# Patient Record
Sex: MALE | Race: BLACK OR AFRICAN AMERICAN | NOT HISPANIC OR LATINO | URBAN - METROPOLITAN AREA
[De-identification: names, ages, dates, MRNs, and addresses within clinical notes are randomized per-mention and may not be internally consistent; named-entity substitution may affect disease eponyms.]

---

## 2018-03-01 ENCOUNTER — EMERGENCY (EMERGENCY)
Facility: HOSPITAL | Age: 33
LOS: 1 days | Discharge: HOME | End: 2018-03-01
Admitting: EMERGENCY MEDICINE
Payer: COMMERCIAL

## 2018-03-01 VITALS
HEART RATE: 75 BPM | OXYGEN SATURATION: 99 % | SYSTOLIC BLOOD PRESSURE: 128 MMHG | DIASTOLIC BLOOD PRESSURE: 92 MMHG | RESPIRATION RATE: 16 BRPM | TEMPERATURE: 98 F
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
## 2018-03-01 DIAGNOSIS — S61.411A LACERATION WITHOUT FOREIGN BODY OF RIGHT HAND, INITIAL ENCOUNTER: ICD-10-CM
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
## 2018-03-01 DIAGNOSIS — Z23 ENCOUNTER FOR IMMUNIZATION: ICD-10-CM

## 2018-03-01 PROCEDURE — 73130 X-RAY EXAM OF HAND: CPT | Mod: 26,RT

## 2018-03-01 PROCEDURE — 99283 EMERGENCY DEPT VISIT LOW MDM: CPT | Mod: 25

## 2018-03-01 PROCEDURE — 12001 RPR S/N/AX/GEN/TRNK 2.5CM/<: CPT

## 2018-03-01 RX ORDER — TETANUS TOXOID, REDUCED DIPHTHERIA TOXOID AND ACELLULAR PERTUSSIS VACCINE, ADSORBED 5; 2.5; 8; 8; 2.5 [IU]/.5ML; [IU]/.5ML; UG/.5ML; UG/.5ML; UG/.5ML
0.5 SUSPENSION INTRAMUSCULAR ONCE
Qty: 0 | Refills: 0 | Status: COMPLETED | OUTPATIENT
Start: 2018-03-01 | End: 2018-03-01

## 2018-03-01 RX ORDER — CEPHALEXIN 500 MG
1 CAPSULE ORAL
Qty: 40 | Refills: 0 | OUTPATIENT
Start: 2018-03-01 | End: 2018-03-10

## 2018-03-01 RX ADMIN — TETANUS TOXOID, REDUCED DIPHTHERIA TOXOID AND ACELLULAR PERTUSSIS VACCINE, ADSORBED 0.5 MILLILITER(S): 5; 2.5; 8; 8; 2.5 SUSPENSION INTRAMUSCULAR at 15:24

## 2018-03-01 NOTE — ED PROVIDER NOTE - MUSCULOSKELETAL, MLM
Able to move all fingers against resistance, capillary refill less than 3 seconds. Distal pulses equal.

## 2018-03-01 NOTE — ED PROVIDER NOTE - SKIN, MLM
2cm laceration at hypothenar over palmar aspect of the right hand with exposed subcutaneous tissue, no active bleeding.

## 2018-03-01 NOTE — ED PROVIDER NOTE - OBJECTIVE STATEMENT
32 y/o male with no PMHx presents to ED c/o laceration to the right hand. While at work, patient was holding a 40 pound weight when he hit his hand against the metal edge. Denies focal weakness, paresthesia, fall, or LOC. Date of last tetanus is unknown. 32 y/o male with no PMHx presents to ED c/o laceration to the right hand. While at work, patient was holding a 40 pound weight when he hit his hand against the metal edge. Denies focal weakness, paresthesia, fall, or LOC. Date of last tetanus is unknown. patient is ambidextrous

## 2018-03-01 NOTE — ED ADULT TRIAGE NOTE - CHIEF COMPLAINT QUOTE
pt presents with laceration to the right hand, unsure of last tetanus shot. bleeding controlled at this time.

## 2018-03-09 ENCOUNTER — EMERGENCY (EMERGENCY)
Facility: HOSPITAL | Age: 33
LOS: 1 days | Discharge: HOME | End: 2018-03-09
Admitting: EMERGENCY MEDICINE
Payer: COMMERCIAL

## 2018-03-09 VITALS
OXYGEN SATURATION: 98 % | HEART RATE: 67 BPM | SYSTOLIC BLOOD PRESSURE: 111 MMHG | RESPIRATION RATE: 18 BRPM | DIASTOLIC BLOOD PRESSURE: 71 MMHG | TEMPERATURE: 98 F
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
## 2018-03-09 DIAGNOSIS — Z79.2 LONG TERM (CURRENT) USE OF ANTIBIOTICS: ICD-10-CM

## 2018-03-09 DIAGNOSIS — S61.411D LACERATION WITHOUT FOREIGN BODY OF RIGHT HAND, SUBSEQUENT ENCOUNTER: ICD-10-CM

## 2018-03-09 DIAGNOSIS — Z48.02 ENCOUNTER FOR REMOVAL OF SUTURES: ICD-10-CM
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
## 2018-03-09 PROCEDURE — 99282 EMERGENCY DEPT VISIT SF MDM: CPT

## 2018-03-09 NOTE — ED ADULT NURSE NOTE - OBJECTIVE STATEMENT
pt is a 32 year old male who comes in for suture removal to the right arm. pt denies any fevers, chills or pain.

## 2018-03-09 NOTE — ED PROVIDER NOTE - NS_EDPROVIDERDISPOUSERTYPE_ED_A_ED
I have personally evaluated and examined the patient. The Attending was available to me as a supervising provider if needed. Scribe Attestation (For Scribes USE Only)... Scribe Attestation (For Scribes USE Only).../I have personally evaluated and examined the patient. The Attending was available to me as a supervising provider if needed.

## 2018-03-09 NOTE — ED PROVIDER NOTE - OBJECTIVE STATEMENT
33 y/o Male presents to the ED for suture removal after 7 days. Pt states he has been washing wound with soap and water and has applied bacitracin with a band-aide, wrapped it with coband and then a sleeve. Denies fever chills, and drainage. States wound is tender unless he places bacitracin on it.

## 2018-03-09 NOTE — ED PROVIDER NOTE - MEDICAL DECISION MAKING DETAILS
Pt returned for suture removal of a hand laceration from 7 days ago. Wound is not well healed, likely secondary to excess moisture. Sutures left in place and advised more air time and return in 3 days.

## 2018-03-09 NOTE — ED ADULT NURSE NOTE - CHPI ED SYMPTOMS NEG
no inflammation/no chills/no purulent drainage/no bleeding at site/no redness/no bleeding/no drainage/no pain/no fever

## 2018-03-09 NOTE — ED PROVIDER NOTE - CARE PLAN
Principal Discharge DX:	Laceration of hand, foreign body presence unspecified, unspecified laterality, subsequent encounter

## 2019-07-22 ENCOUNTER — EMERGENCY (EMERGENCY)
Facility: HOSPITAL | Age: 34
LOS: 0 days | Discharge: HOME | End: 2019-07-22
Attending: EMERGENCY MEDICINE | Admitting: EMERGENCY MEDICINE
Payer: OTHER MISCELLANEOUS

## 2019-07-22 VITALS — WEIGHT: 259.93 LBS | HEIGHT: 71 IN

## 2019-07-22 VITALS
RESPIRATION RATE: 18 BRPM | DIASTOLIC BLOOD PRESSURE: 77 MMHG | HEART RATE: 62 BPM | SYSTOLIC BLOOD PRESSURE: 149 MMHG | TEMPERATURE: 99 F | OXYGEN SATURATION: 100 %

## 2019-07-22 DIAGNOSIS — S01.81XA LACERATION WITHOUT FOREIGN BODY OF OTHER PART OF HEAD, INITIAL ENCOUNTER: ICD-10-CM

## 2019-07-22 DIAGNOSIS — S09.90XA UNSPECIFIED INJURY OF HEAD, INITIAL ENCOUNTER: ICD-10-CM
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
## 2019-07-22 DIAGNOSIS — R55 SYNCOPE AND COLLAPSE: ICD-10-CM
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
## 2019-07-22 LAB
ALBUMIN SERPL ELPH-MCNC: 4.2 G/DL — SIGNIFICANT CHANGE UP (ref 3.5–5.2)
ALP SERPL-CCNC: 84 U/L — SIGNIFICANT CHANGE UP (ref 30–115)
ALT FLD-CCNC: 21 U/L — SIGNIFICANT CHANGE UP (ref 0–41)
ANION GAP SERPL CALC-SCNC: 10 MMOL/L — SIGNIFICANT CHANGE UP (ref 7–14)
APTT BLD: 30.2 SEC — SIGNIFICANT CHANGE UP (ref 27–39.2)
AST SERPL-CCNC: 24 U/L — SIGNIFICANT CHANGE UP (ref 0–41)
BASOPHILS # BLD AUTO: 0.03 K/UL — SIGNIFICANT CHANGE UP (ref 0–0.2)
BASOPHILS NFR BLD AUTO: 0.3 % — SIGNIFICANT CHANGE UP (ref 0–1)
BILIRUB SERPL-MCNC: 0.3 MG/DL — SIGNIFICANT CHANGE UP (ref 0.2–1.2)
BUN SERPL-MCNC: 15 MG/DL — SIGNIFICANT CHANGE UP (ref 10–20)
CALCIUM SERPL-MCNC: 9.3 MG/DL — SIGNIFICANT CHANGE UP (ref 8.5–10.1)
CHLORIDE SERPL-SCNC: 101 MMOL/L — SIGNIFICANT CHANGE UP (ref 98–110)
CO2 SERPL-SCNC: 27 MMOL/L — SIGNIFICANT CHANGE UP (ref 17–32)
CREAT SERPL-MCNC: 1.1 MG/DL — SIGNIFICANT CHANGE UP (ref 0.7–1.5)
EOSINOPHIL # BLD AUTO: 0.11 K/UL — SIGNIFICANT CHANGE UP (ref 0–0.7)
EOSINOPHIL NFR BLD AUTO: 1 % — SIGNIFICANT CHANGE UP (ref 0–8)
ETHANOL SERPL-MCNC: <10 MG/DL — SIGNIFICANT CHANGE UP
GLUCOSE BLDC GLUCOMTR-MCNC: 106 MG/DL — HIGH (ref 70–99)
GLUCOSE SERPL-MCNC: 111 MG/DL — HIGH (ref 70–99)
HCT VFR BLD CALC: 40.4 % — LOW (ref 42–52)
HGB BLD-MCNC: 13.3 G/DL — LOW (ref 14–18)
IMM GRANULOCYTES NFR BLD AUTO: 1.1 % — HIGH (ref 0.1–0.3)
INR BLD: 0.96 RATIO — SIGNIFICANT CHANGE UP (ref 0.65–1.3)
LACTATE SERPL-SCNC: 1 MMOL/L — SIGNIFICANT CHANGE UP (ref 0.5–2.2)
LIDOCAIN IGE QN: 19 U/L — SIGNIFICANT CHANGE UP (ref 7–60)
LYMPHOCYTES # BLD AUTO: 3.57 K/UL — HIGH (ref 1.2–3.4)
LYMPHOCYTES # BLD AUTO: 32.2 % — SIGNIFICANT CHANGE UP (ref 20.5–51.1)
MCHC RBC-ENTMCNC: 28.7 PG — SIGNIFICANT CHANGE UP (ref 27–31)
MCHC RBC-ENTMCNC: 32.9 G/DL — SIGNIFICANT CHANGE UP (ref 32–37)
MCV RBC AUTO: 87.3 FL — SIGNIFICANT CHANGE UP (ref 80–94)
MONOCYTES # BLD AUTO: 0.7 K/UL — HIGH (ref 0.1–0.6)
MONOCYTES NFR BLD AUTO: 6.3 % — SIGNIFICANT CHANGE UP (ref 1.7–9.3)
NEUTROPHILS # BLD AUTO: 6.56 K/UL — HIGH (ref 1.4–6.5)
NEUTROPHILS NFR BLD AUTO: 59.1 % — SIGNIFICANT CHANGE UP (ref 42.2–75.2)
NRBC # BLD: 0 /100 WBCS — SIGNIFICANT CHANGE UP (ref 0–0)
PLATELET # BLD AUTO: 242 K/UL — SIGNIFICANT CHANGE UP (ref 130–400)
POTASSIUM SERPL-MCNC: 3.9 MMOL/L — SIGNIFICANT CHANGE UP (ref 3.5–5)
POTASSIUM SERPL-SCNC: 3.9 MMOL/L — SIGNIFICANT CHANGE UP (ref 3.5–5)
PROT SERPL-MCNC: 7.3 G/DL — SIGNIFICANT CHANGE UP (ref 6–8)
PROTHROM AB SERPL-ACNC: 11 SEC — SIGNIFICANT CHANGE UP (ref 9.95–12.87)
RBC # BLD: 4.63 M/UL — LOW (ref 4.7–6.1)
RBC # FLD: 14.7 % — HIGH (ref 11.5–14.5)
SODIUM SERPL-SCNC: 138 MMOL/L — SIGNIFICANT CHANGE UP (ref 135–146)
TROPONIN T SERPL-MCNC: <0.01 NG/ML — SIGNIFICANT CHANGE UP
WBC # BLD: 11.09 K/UL — HIGH (ref 4.8–10.8)
WBC # FLD AUTO: 11.09 K/UL — HIGH (ref 4.8–10.8)

## 2019-07-22 PROCEDURE — 93308 TTE F-UP OR LMTD: CPT | Mod: 26

## 2019-07-22 PROCEDURE — 72170 X-RAY EXAM OF PELVIS: CPT | Mod: 26

## 2019-07-22 PROCEDURE — 12011 RPR F/E/E/N/L/M 2.5 CM/<: CPT

## 2019-07-22 PROCEDURE — 71045 X-RAY EXAM CHEST 1 VIEW: CPT | Mod: 26

## 2019-07-22 PROCEDURE — 71260 CT THORAX DX C+: CPT | Mod: 26

## 2019-07-22 PROCEDURE — 93010 ELECTROCARDIOGRAM REPORT: CPT

## 2019-07-22 PROCEDURE — 99282 EMERGENCY DEPT VISIT SF MDM: CPT

## 2019-07-22 PROCEDURE — 72125 CT NECK SPINE W/O DYE: CPT | Mod: 26

## 2019-07-22 PROCEDURE — 70486 CT MAXILLOFACIAL W/O DYE: CPT | Mod: 26

## 2019-07-22 PROCEDURE — 70450 CT HEAD/BRAIN W/O DYE: CPT | Mod: 26

## 2019-07-22 PROCEDURE — 76705 ECHO EXAM OF ABDOMEN: CPT | Mod: 26

## 2019-07-22 PROCEDURE — 76604 US EXAM CHEST: CPT | Mod: 26

## 2019-07-22 PROCEDURE — 99285 EMERGENCY DEPT VISIT HI MDM: CPT | Mod: 25

## 2019-07-22 PROCEDURE — 74177 CT ABD & PELVIS W/CONTRAST: CPT | Mod: 26

## 2019-07-22 RX ORDER — METHOCARBAMOL 500 MG/1
2 TABLET, FILM COATED ORAL
Qty: 42 | Refills: 0
Start: 2019-07-22 | End: 2019-07-28

## 2019-07-22 NOTE — ED PROCEDURE NOTE - US POC STATEMENT
The patient/family was/were informed of limited nature of the exam. Representative images were printed to be scanned into the chart.

## 2019-07-22 NOTE — ED PROVIDER NOTE - NS ED ROS FT
Constitutional: No altered mental status.  Eyes: No visual changes.  ENT: No hearing loss.  Neck: No neck pain or stiffness.  Cardiovascular: No chest pain, palpitations.  Pulmonary: No SOB. No hemoptysis.  Abdominal: No abdominal pain, nausea, vomiting.   : No hematuria.  Neuro: +headache, +syncope. no dizziness.  MS: No back pain. No deformities.  Psych: No suicidal ideations.

## 2019-07-22 NOTE — ED PROVIDER NOTE - NSFOLLOWUPINSTRUCTIONS_ED_ALL_ED_FT
Syncope    Syncope is when you temporarily lose consciousness, also called fainting or passing out. It is caused by a sudden decrease in blood flow to the brain. Even though most causes of syncope are not dangerous, syncope can possibly be a sign of a serious medical problem. Signs that you may be about to faint include feeling dizzy, lightheaded, nausea, visual changes, or cold/clammy skin. Do not drive, operate heavy machinery, or play sports until your health care provider says it is okay.    SEEK IMMEDIATE MEDICAL CARE IF YOU HAVE ANY OF THE FOLLOWING SYMPTOMS: severe headache, pain in your chest/abdomen/back, bleeding from your mouth or rectum, palpitations, shortness of breath, pain with breathing, seizure, confusion, or trouble walking.    Laceration    A laceration is a cut that goes through all of the layers of the skin and into the tissue that is right under the skin. Some lacerations heal on their own. Others need to be closed with skin adhesive strips, skin glue, stitches (sutures), or staples. Proper laceration care minimizes the risk of infection and helps the laceration to heal better.  If non-absorbable stitches or staples have been placed, they must be taken out within the time frame instructed by your healthcare provider.    SEEK IMMEDIATE MEDICAL CARE IF YOU HAVE ANY OF THE FOLLOWING SYMPTOMS: swelling around the wound, worsening pain, drainage from the wound, red streaking going away from your wound, inability to move finger or toe near the laceration, or discoloration of skin near the laceration.    Closed Head Injury    A closed head injury is an injury to your head that may or may not involve a traumatic brain injury (TBI). Symptoms of TBI can be short or long lasting and include headache, dizziness, interference with memory or speech, fatigue, confusion, changes in sleep, mood changes, nausea, depression/anxiety, and dulling of senses. Make sure to obtain proper rest which includes getting plenty of sleep, avoiding excessive visual stimulation, and avoiding activities that may cause physical or mental stress. Avoid any situation where there is potential for another head injury, including sports.    SEEK IMMEDIATE MEDICAL CARE IF YOU HAVE ANY OF THE FOLLOWING SYMPTOMS: unusual drowsiness, vomiting, severe dizziness, seizures, lightheadedness, muscular weakness, different pupil sizes, visual changes, or clear or bloody discharge from your ears or nose.    Take ibuprofen 600 mg with tylenol 650mg up to three times a day with food as needed for pain/headache. Take Robaxin as directed for spasms.

## 2019-07-22 NOTE — ED PROVIDER NOTE - CLINICAL SUMMARY MEDICAL DECISION MAKING FREE TEXT BOX
Trauma work up negative and shows no serious injury.  FAST negative.  Bedside echo normal. Laceration repaired as described.  Patient ate and drank and walked around without issue.  He feels much better. ED work up discussed and stay in OBS for syncope offered.  Patient would prefer to be discharged with appropriate instructions, home care and follow up.  Patient to return in 5 days for wound check and suture removal.     Full DC instructions discussed and patient knows when to seek immediate medical attention.  Patient has proper follow up.  All results discussed and patient aware they may require further work up.  Proper follow up ensured. Limitations of ED work up discussed.  Medications administered and prescribed/OTC home meds discussed.  All questions and concerns from patient or family addressed. Understanding of instructions verbalized.

## 2019-07-22 NOTE — ED PROCEDURE NOTE - PROCEDURE NAME, MLM
Laceration Repair
Point of Care Ultrasound Cardiac
Point of Care Ultrasound Lung
Point of Care Ultrasound FAST
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
## 2019-07-22 NOTE — ED PROCEDURE NOTE - ATTENDING CONTRIBUTION TO CARE
I was physically present for and directly supervised this procedure.  Procedure was done as documented without any complications.

## 2019-07-22 NOTE — ED PROVIDER NOTE - NSFOLLOWUPCLINICS_GEN_ALL_ED_FT
[facility] Concussion Program  Concussion Program  [address] [place] [address]   Phone: [phone]  Fax:   Follow Up Time:

## 2019-07-22 NOTE — ED PROVIDER NOTE - PHYSICAL EXAMINATION
Constitutional: Well developed, well nourished. NAD  TRAUMA: ABC intact. GCS 15. FAST negative.  Head: Normocephalic, atraumatic.  Eyes: PERRL. EOMI. No Raccoon eyes.   ENT: No nasal discharge. No septal hematoma. No Villarreal sign. Mucous membranes moist.  Neck: Supple. Painless ROM. No midline tenderness, stepoffs.  Cardiovascular: Normal S1, S2. Regular rate and rhythm. No murmurs, rubs, or gallops.  Pulmonary: Normal respiratory rate and effort. Lungs clear to auscultation bilaterally. No wheezing, rales, or rhonchi.  CHEST: No chest wall tenderness, crepitus.  Abdominal: Soft. Nondistended. Nontender. No rebound, guarding, rigidity.  BACK: No midline T/L/S tenderness, stepoffs. No saddle paresthesia.  Extremities. Pelvis stable. No traumatic deformities, tenderness of extremities.  Skin: No rashes, cyanosis. +laceration above L eye 2 cm.  Neuro: AAOx3. Strength 5/5 in all extremities. Sensation intact throughout. No focal neurological deficits.  Psych: Normal mood. Normal affect.
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
## 2019-07-22 NOTE — ED ADULT NURSE NOTE - NSIMPLEMENTINTERV_GEN_ALL_ED
67.6
Implemented All Fall with Harm Risk Interventions:  [place] to call system. Call bell, personal items and telephone within reach. Instruct patient to call for assistance. Room bathroom lighting operational. Non-slip footwear when patient is off stretcher. Physically safe environment: no spills, clutter or unnecessary equipment. Stretcher in lowest position, wheels locked, appropriate side rails in place. Provide visual cue, wrist band, yellow gown, etc. Monitor gait and stability. Monitor for mental status changes and reorient to person, place, and time. Review medications for side effects contributing to fall risk. Reinforce activity limits and safety measures with patient and family. Provide visual clues: red socks.

## 2019-07-22 NOTE — ED ADULT NURSE NOTE - OBJECTIVE STATEMENT
Pt presents to ED S/P work injury. Pt states he was hit with a beam at work. Pt reports positive LOC, denies use of anticoagulation/antiplatelet medication. 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 Pt with left forehead laceration above left eyebrow.
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
## 2019-07-24 PROBLEM — Z78.9 OTHER SPECIFIED HEALTH STATUS: Chronic | Status: ACTIVE | Noted: 2019-07-22

## 2019-07-25 ENCOUNTER — OTHER (OUTPATIENT)
Age: 34
End: 2019-07-25

## 2019-07-25 ENCOUNTER — TRANSCRIPTION ENCOUNTER (OUTPATIENT)
Age: 34
End: 2019-07-25

## 2019-07-25 ENCOUNTER — APPOINTMENT (OUTPATIENT)
Dept: FAMILY MEDICINE | Facility: CLINIC | Age: 34
End: 2019-07-25
Payer: COMMERCIAL

## 2019-07-25 VITALS
BODY MASS INDEX: 34.72 KG/M2 | HEART RATE: 98 BPM | TEMPERATURE: 99 F | HEIGHT: 71 IN | DIASTOLIC BLOOD PRESSURE: 72 MMHG | SYSTOLIC BLOOD PRESSURE: 110 MMHG | WEIGHT: 248 LBS | OXYGEN SATURATION: 63 %

## 2019-07-25 DIAGNOSIS — Z78.9 OTHER SPECIFIED HEALTH STATUS: ICD-10-CM

## 2019-07-25 PROCEDURE — 99203 OFFICE O/P NEW LOW 30 MIN: CPT

## 2019-07-25 RX ORDER — METHOCARBAMOL 500 MG/1
TABLET, FILM COATED ORAL
Refills: 0 | Status: COMPLETED | COMMUNITY

## 2019-07-25 NOTE — PHYSICAL EXAM
[Coordination Grossly Intact] : coordination grossly intact [No Focal Deficits] : no focal deficits [Normal Gait] : normal gait [Deep Tendon Reflexes (DTR)] : deep tendon reflexes were 2+ and symmetric [Normal] : affect was normal and insight and judgment were intact [de-identified] : edematous left eye with large area of ecchymosis. Laceration left eyebrow, healing well. No discharge. 5 sutures in place. [de-identified] : Loss of sensation over V1 distribution on left side of face.
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
## 2019-07-25 NOTE — REVIEW OF SYSTEMS
[Fatigue] : fatigue [Discharge] : discharge [Headache] : headache [Dizziness] : dizziness [Negative] : Heme/Lymph

## 2019-07-30 ENCOUNTER — TRANSCRIPTION ENCOUNTER (OUTPATIENT)
Age: 34
End: 2019-07-30

## 2019-08-01 ENCOUNTER — TRANSCRIPTION ENCOUNTER (OUTPATIENT)
Age: 34
End: 2019-08-01

## 2019-08-05 ENCOUNTER — APPOINTMENT (OUTPATIENT)
Dept: NEUROSURGERY | Facility: CLINIC | Age: 34
End: 2019-08-05
Payer: OTHER MISCELLANEOUS

## 2019-08-05 VITALS
SYSTOLIC BLOOD PRESSURE: 144 MMHG | DIASTOLIC BLOOD PRESSURE: 83 MMHG | OXYGEN SATURATION: 97 % | WEIGHT: 248 LBS | HEART RATE: 65 BPM | HEIGHT: 71 IN | BODY MASS INDEX: 34.72 KG/M2 | RESPIRATION RATE: 17 BRPM | TEMPERATURE: 98.3 F

## 2019-08-05 DIAGNOSIS — R42 DIZZINESS AND GIDDINESS: ICD-10-CM

## 2019-08-05 DIAGNOSIS — R51 HEADACHE: ICD-10-CM

## 2019-08-05 PROCEDURE — 99203 OFFICE O/P NEW LOW 30 MIN: CPT

## 2019-08-05 RX ORDER — RIBOFLAVIN (VITAMIN B2) 400 MG
400 TABLET ORAL
Qty: 30 | Refills: 0 | Status: ACTIVE | COMMUNITY
Start: 2019-08-05 | End: 1900-01-01

## 2019-08-05 NOTE — REVIEW OF SYSTEMS
[Feeling Tired] : feeling tired [Numbness] : numbness [Dizziness] : dizziness [Lightheadedness] : lightheadedness [Tension Headache] : tension-type headaches [Negative] : Heme/Lymph

## 2019-08-07 ENCOUNTER — APPOINTMENT (OUTPATIENT)
Dept: FAMILY MEDICINE | Facility: CLINIC | Age: 34
End: 2019-08-07
Payer: COMMERCIAL

## 2019-08-07 ENCOUNTER — LABORATORY RESULT (OUTPATIENT)
Age: 34
End: 2019-08-07

## 2019-08-07 VITALS
SYSTOLIC BLOOD PRESSURE: 129 MMHG | HEIGHT: 71 IN | DIASTOLIC BLOOD PRESSURE: 66 MMHG | BODY MASS INDEX: 35.84 KG/M2 | HEART RATE: 78 BPM | OXYGEN SATURATION: 98 % | WEIGHT: 256 LBS

## 2019-08-07 DIAGNOSIS — Z00.00 ENCOUNTER FOR GENERAL ADULT MEDICAL EXAMINATION W/OUT ABNORMAL FINDINGS: ICD-10-CM

## 2019-08-07 DIAGNOSIS — R79.89 OTHER SPECIFIED ABNORMAL FINDINGS OF BLOOD CHEMISTRY: ICD-10-CM

## 2019-08-07 DIAGNOSIS — Z11.3 ENCOUNTER FOR SCREENING FOR INFECTIONS WITH A PREDOMINANTLY SEXUAL MODE OF TRANSMISSION: ICD-10-CM

## 2019-08-07 PROCEDURE — 99395 PREV VISIT EST AGE 18-39: CPT | Mod: 25

## 2019-08-07 PROCEDURE — 36415 COLL VENOUS BLD VENIPUNCTURE: CPT

## 2019-08-07 NOTE — HEALTH RISK ASSESSMENT
[] : No [No] : In the past 12 months have you used drugs other than those required for medical reasons? No [No falls in past year] : Patient reported no falls in the past year [de-identified] : active [[id number]] : 0 [de-identified] : regular [Employed] : employed [] :  [Sexually Active] : sexually active [High Risk Behavior] : no high risk behavior [Fully functional (bathing, dressing, toileting, transferring, walking, feeding)] : Fully functional (bathing, dressing, toileting, transferring, walking, feeding) [Fully functional (using the telephone, shopping, preparing meals, housekeeping, doing laundry, using] : Fully functional and needs no help or supervision to perform IADLs (using the telephone, shopping, preparing meals, housekeeping, doing laundry, using transportation, managing medications and managing finances) [FreeTextEntry2] : construction
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
## 2019-08-09 LAB
25(OH)D3 SERPL-MCNC: 22.5 NG/ML
ALBUMIN SERPL ELPH-MCNC: 4.8 G/DL
ALP BLD-CCNC: 92 U/L
ALT SERPL-CCNC: 41 U/L
ANION GAP SERPL CALC-SCNC: 15 MMOL/L
AST SERPL-CCNC: 18 U/L
BASOPHILS # BLD AUTO: 0.05 K/UL
BASOPHILS NFR BLD AUTO: 0.3 %
BILIRUB SERPL-MCNC: 0.2 MG/DL
BUN SERPL-MCNC: 23 MG/DL
C TRACH RRNA SPEC QL NAA+PROBE: NOT DETECTED
CALCIUM SERPL-MCNC: 10.4 MG/DL
CHLORIDE SERPL-SCNC: 99 MMOL/L
CHOLEST SERPL-MCNC: 191 MG/DL
CHOLEST/HDLC SERPL: 3.6 RATIO
CO2 SERPL-SCNC: 25 MMOL/L
CREAT SERPL-MCNC: 1.1 MG/DL
EOSINOPHIL # BLD AUTO: 0.12 K/UL
EOSINOPHIL NFR BLD AUTO: 0.8 %
ESTIMATED AVERAGE GLUCOSE: 114 MG/DL
GLUCOSE SERPL-MCNC: 76 MG/DL
HAV IGM SER QL: NONREACTIVE
HBA1C MFR BLD HPLC: 5.6 %
HBV CORE IGM SER QL: NONREACTIVE
HBV SURFACE AG SER QL: NONREACTIVE
HCT VFR BLD CALC: 45 %
HCV AB SER QL: NONREACTIVE
HCV S/CO RATIO: 0.1 S/CO
HDLC SERPL-MCNC: 53 MG/DL
HGB BLD-MCNC: 14 G/DL
HIV1+2 AB SPEC QL IA.RAPID: NONREACTIVE
IMM GRANULOCYTES NFR BLD AUTO: 1.4 %
LDLC SERPL CALC-MCNC: 125 MG/DL
LYMPHOCYTES # BLD AUTO: 3.79 K/UL
LYMPHOCYTES NFR BLD AUTO: 24.4 %
MAN DIFF?: NORMAL
MCHC RBC-ENTMCNC: 27.6 PG
MCHC RBC-ENTMCNC: 31.1 GM/DL
MCV RBC AUTO: 88.8 FL
MONOCYTES # BLD AUTO: 1.24 K/UL
MONOCYTES NFR BLD AUTO: 8 %
N GONORRHOEA RRNA SPEC QL NAA+PROBE: NOT DETECTED
NEUTROPHILS # BLD AUTO: 10.11 K/UL
NEUTROPHILS NFR BLD AUTO: 65.1 %
PLATELET # BLD AUTO: 298 K/UL
POTASSIUM SERPL-SCNC: 4.4 MMOL/L
PROT SERPL-MCNC: 7.6 G/DL
RBC # BLD: 5.07 M/UL
RBC # FLD: 15.7 %
SODIUM SERPL-SCNC: 139 MMOL/L
SOURCE AMPLIFICATION: NORMAL
T PALLIDUM AB SER QL IA: NEGATIVE
TRIGL SERPL-MCNC: 63 MG/DL
TSH SERPL-ACNC: 4.38 UIU/ML
WBC # FLD AUTO: 15.53 K/UL

## 2019-08-11 NOTE — RESULTS/DATA
[FreeTextEntry1] : CT by report [facility] 7/22/19:  \par CT c-spine:  no acute fracture or subluxation\par CT brain:  wnl\par CT maxillofacial:  left forehead and periorbital soft tissue swelling with superficial laceration.  no acute fracture or globe injury.

## 2019-08-11 NOTE — PHYSICAL EXAM
[General Appearance - Alert] : alert [General Appearance - In No Acute Distress] : in no acute distress [Oriented To Time, Place, And Person] : oriented to person, place, and time [Impaired Insight] : insight and judgment were intact [Affect] : the affect was normal [Person] : oriented to person [Place] : oriented to place [Short Term Intact] : short term memory intact [Time] : oriented to time [Remote Intact] : remote memory intact [Span Intact] : the attention span was normal [Concentration Intact] : normal concentrating ability [Comprehension] : comprehension intact [Fluency] : fluency intact [Current Events] : adequate knowledge of current events [Past History] : adequate knowledge of personal past history [Vocabulary] : adequate range of vocabulary [Cranial Nerves Oculomotor (III)] : extraocular motion intact [Cranial Nerves Vestibulocochlear (VIII)] : hearing was intact bilaterally [Cranial Nerves Accessory (XI - Cranial And Spinal)] : head turning and shoulder shrug symmetric [Cranial Nerves Glossopharyngeal (IX)] : tongue and palate midline [Motor Tone] : muscle tone was normal in all four extremities [Cranial Nerves Hypoglossal (XII)] : there was no tongue deviation with protrusion [Motor Strength] : muscle strength was normal in all four extremities [Motor Handedness Right-Handed] : the patient is right hand dominant [No Muscle Atrophy] : normal bulk in all four extremities [Sensation Tactile Decrease] : light touch was intact [Balance] : balance was intact [Extraocular Movements] : extraocular movements were intact [Outer Ear] : the ears and nose were normal in appearance [Neck Appearance] : the appearance of the neck was normal [] : no respiratory distress [Respiration, Rhythm And Depth] : normal respiratory rhythm and effort [Exaggerated Use Of Accessory Muscles For Inspiration] : no accessory muscle use [Heart Rate And Rhythm] : heart rate was normal and rhythm regular [Abnormal Walk] : normal gait [Involuntary Movements] : no involuntary movements were seen [Skin Color & Pigmentation] : normal skin color and pigmentation [Skin Turgor] : normal skin turgor [Cranial Nerves Optic (II)] : visual acuity intact bilaterally,  pupils equal round and reactive to light [Limited Balance] : balance was intact [Past-pointing] : there was no past-pointing [Tremor] : no tremor present [Coordination - Dysmetria Impaired Finger-to-Nose Bilateral] : not present [FreeTextEntry5] : V1 distribution on left side w loss sensation , unable to raise left eyebrow completely [FreeTextEntry6] : no pronator drift [FreeTextEntry1] : heard finger rub

## 2019-08-11 NOTE — ASSESSMENT
[FreeTextEntry1] : PT s/p concussion sustained on 7/22 at work.\par Plan: aerobic exercise\par Cannot work Workers Compensation\par Medrol dose deana\par Vit B2 400 mg po daily\par  Magnesium 400 mg po daily\par Return in two weeks for follow up.\par PT with balance therapy.

## 2019-08-11 NOTE — HISTORY OF PRESENT ILLNESS
[de-identified] : 35 yo right handed male with no PMH presents to the office for a consultation for concussion. Pt works as a  and on 7/22/19 while working on the [employer], he was snagged and catapulted to a metal beam above his head, hit head and lost consciousness for about 5 minutes.  He was wearing a harness and PPE for safety. He woke up disoriented, with a headache and an left eye laceration. He screamed for help and his supervisor drove him to [facility] and had 3 syncopal episodes He underwent CT cspine, ct head, ct maxillofacial, ct chest ct abdomen and pelvis, xray pelvis and cxr all which were negative as per reports. He was rx Robaxin, Tylenol and Ibuprofen and sent home. \par \par For the past 2 weeks, he has not been active. He lays around during the day napping. He does walk his 100lb [employer] and feels tired after and headache worsens. He is constantly tired, c/o dizziness (light headedness) and has a headache everyday. He takes Aleve with no relief. The headaches occur on left frontal area and radiate to top of head. \par He states Robaxin does not help.\par Symptoms not improving and have not worsened. \par Concussion score 63\par 6:  Headache, light sensitivity, trouble falling asleep, nervousness, numbness or tingling\par 5: dizziness\par 3:  nausea, balance problems, sleeping more than usual, drowsiness, more emotional than usual, irritability, feeling slowed down, difficulty concentrating\par 2: sadness\par 1: noise sensitivity, feeling in a fog.\par \par

## 2019-09-06 ENCOUNTER — APPOINTMENT (OUTPATIENT)
Dept: NEUROSURGERY | Facility: CLINIC | Age: 34
End: 2019-09-06
Payer: OTHER MISCELLANEOUS

## 2019-09-06 VITALS
BODY MASS INDEX: 35.84 KG/M2 | OXYGEN SATURATION: 95 % | SYSTOLIC BLOOD PRESSURE: 122 MMHG | HEART RATE: 89 BPM | DIASTOLIC BLOOD PRESSURE: 82 MMHG | TEMPERATURE: 97.7 F | RESPIRATION RATE: 18 BRPM | HEIGHT: 71 IN | WEIGHT: 256 LBS

## 2019-09-06 PROCEDURE — 99214 OFFICE O/P EST MOD 30 MIN: CPT

## 2019-09-06 NOTE — PHYSICAL EXAM
[Cranial Nerves Oculomotor (III)] : extraocular motion intact [Cranial Nerves Optic (II)] : visual acuity intact bilaterally,  pupils equal round and reactive to light [Cranial Nerves Facial (VII)] : face symmetrical [Cranial Nerves Trigeminal (V)] : facial sensation intact symmetrically [Cranial Nerves Glossopharyngeal (IX)] : tongue and palate midline [Cranial Nerves Accessory (XI - Cranial And Spinal)] : head turning and shoulder shrug symmetric [Cranial Nerves Vestibulocochlear (VIII)] : hearing was intact bilaterally [Motor Tone] : muscle tone was normal in all four extremities [Cranial Nerves Hypoglossal (XII)] : there was no tongue deviation with protrusion [Motor Strength] : muscle strength was normal in all four extremities [Involuntary Movements] : no involuntary movements were seen [Motor Handedness Right-Handed] : the patient is right hand dominant [Limited Balance] : the patient's balance was impaired [Abnormal Walk] : normal gait [Sclera] : the sclera and conjunctiva were normal [Extraocular Movements] : extraocular movements were intact [PERRL With Normal Accommodation] : pupils were equal in size, round, reactive to light, with normal accommodation [Hearing Threshold Finger Rub Not Keith] : hearing was normal [Outer Ear] : the ears and nose were normal in appearance [Oropharynx] : the oropharynx was normal [Respiration, Rhythm And Depth] : normal respiratory rhythm and effort [Exaggerated Use Of Accessory Muscles For Inspiration] : no accessory muscle use [Heart Rate And Rhythm] : heart rate was normal and rhythm regular [General Appearance - Alert] : alert [General Appearance - Well Nourished] : well nourished [General Appearance - In No Acute Distress] : in no acute distress [] : normal voice and communication [General Appearance - Well Developed] : well developed [General Appearance - Well-Appearing] : healthy appearing [Well-Healed] : well-healed [Oriented To Time, Place, And Person] : oriented to person, place, and time [Impaired Insight] : insight and judgment were intact [Mood] : the mood was normal [Affect] : the affect was normal [Memory Recent] : recent memory was not impaired [Place] : oriented to place [Person] : oriented to person [Time] : oriented to time [FreeTextEntry5] : no drift both upper extremities [FreeTextEntry8] : has mild imbalance with tandem stance eyes open.  Has very mild imbalance with tandem gait. [FreeTextEntry1] : left eye with mild erythema

## 2019-09-06 NOTE — HISTORY OF PRESENT ILLNESS
[FreeTextEntry1] : 33 yo right handed male was presents on 9/6/19  one month after he was last seen on 8/5/19 s/p concussion. Pt works as a  and on 7/22/19 while working on the [employer], he was snagged and catapulted to a metal beam above his head, hit head and lost consciousness for about 5 minutes. He was wearing a harness and PPE for safety. He woke up disoriented, with a headache and an left eye laceration. He screamed for help and his supervisor drove him to [facility] and had 3 syncopal episodes He underwent CT c-spine, CT head, CT maxillofacial, CT chest CT abdomen and pelvis, x-ray pelvis and CXR all which were negative as per reports. He was RX Robaxin, Tylenol and Ibuprofen and sent home. \par \par He reports today that for the past month he has been having headaches 10/10 and in the last week he has experienced nausea and vomited twice with headaches.  He also has associated symptoms of sensitivity to light .  Headaches are mostly in the left frontal temporal area and radiates to left jaw.  States that the Medrol dose deana that he took relieved his headaches significantly however it made him anxious and some insomnia.  He currently takes Aleve prn 3 x day with food with some relief reported.  He was evaluated for PT sessions and is pending follow up sessions. \par \par Concussion score 42 - previously 63 on 8/5/19\par 6: Headache\par 4 - nausea, sensitivity to light, dizziness\par 3: vomiting, sensitivity to noise, balance problems, trouble falling asleep\par 2: more emotional than usual, numbness or tingling, difficulty with remembering\par 1: sleeping more than usual, drowsiness, irritability, sadness, feeling slowed down, difficulty with concentrating\par 0 - nervousness, feeling like in a "fog"\par

## 2019-09-06 NOTE — ASSESSMENT
[FreeTextEntry1] : IMPRESSION:  Pt is s/p concussion with continuing symptoms.\par 1. Headaches.\par 2. Dizziness\par \par PLAN:\par 1. MRI BRAIN W/O\par 2. Advised patient to continue taking Magnesium and B2.\par 3. [id number] mg 3 x day for dizziness.\par 4. Topamax 25 mg at HS for 1 week then increase to 25 mg 2 x day.\par 5. F/U in 3 weeks.\par 6. Patient to f/u on PT sessions.\par 7. Instructed patient that he is not cleared to drive.

## 2019-10-04 ENCOUNTER — APPOINTMENT (OUTPATIENT)
Dept: NEUROSURGERY | Facility: CLINIC | Age: 34
End: 2019-10-04
Payer: OTHER MISCELLANEOUS

## 2019-10-04 VITALS
DIASTOLIC BLOOD PRESSURE: 81 MMHG | SYSTOLIC BLOOD PRESSURE: 117 MMHG | BODY MASS INDEX: 35.84 KG/M2 | HEIGHT: 71 IN | OXYGEN SATURATION: 95 % | HEART RATE: 67 BPM | TEMPERATURE: 98.2 F | RESPIRATION RATE: 18 BRPM | WEIGHT: 256 LBS

## 2019-10-04 PROCEDURE — 99214 OFFICE O/P EST MOD 30 MIN: CPT

## 2019-11-04 ENCOUNTER — APPOINTMENT (OUTPATIENT)
Dept: NEUROSURGERY | Facility: CLINIC | Age: 34
End: 2019-11-04
Payer: OTHER MISCELLANEOUS

## 2019-11-04 VITALS
HEIGHT: 67 IN | OXYGEN SATURATION: 95 % | RESPIRATION RATE: 18 BRPM | DIASTOLIC BLOOD PRESSURE: 80 MMHG | WEIGHT: 270 LBS | BODY MASS INDEX: 42.38 KG/M2 | SYSTOLIC BLOOD PRESSURE: 126 MMHG | HEART RATE: 73 BPM | TEMPERATURE: 98.3 F

## 2019-11-04 PROCEDURE — 99213 OFFICE O/P EST LOW 20 MIN: CPT

## 2019-11-07 NOTE — PHYSICAL EXAM
[General Appearance - In No Acute Distress] : in no acute distress [Cranial Nerves Oculomotor (III)] : extraocular motion intact [Motor Tone] : muscle tone was normal in all four extremities [Motor Strength] : muscle strength was normal in all four extremities [Abnormal Walk] : normal gait [Balance] : balance was intact [2+] : Ankle jerk left 2+ [FreeTextEntry5] : Loss of sensation in L V1 distribution

## 2019-11-07 NOTE — HISTORY OF PRESENT ILLNESS
[FreeTextEntry1] : 35 yo right handed male was presents on 9/6/19 one month after he was last seen on 8/5/19 s/p concussion. Pt works as a  and on 7/22/19 while working on the [employer], he was snagged and catapulted to a metal beam above his head, hit head and lost consciousness for about 5 minutes. He was wearing a harness and PPE for safety. He woke up disoriented, with a headache and an left eye laceration. He screamed for help and his supervisor drove him to [facility] and had 3 syncopal episodes He underwent CT c-spine, CT head, CT maxillofacial, CT chest CT abdomen and pelvis, x-ray pelvis and CXR all which were negative as per reports. He was RX Robaxin, Tylenol and Ibuprofen and sent home. \par \par Today he reports feeling 60% better.  No more vomiting, still has headaches, dizziness comes and goes and not as bad as before, continues to improve compared to last month. Meclizine and Topamax have helped.  Still taking vitamins. Only did PT once, and the balance is getting better and practice at home, feels stability when walking is improved. Uses stationary bike once per day.  Headaches are left parietal, no neck pain, continues to have numbness in left parietal region and left V1 distribution.  Left supraorbital scar is healed but red in appearance.\par \par Concussion score: 31 (improved from 33 last visit)\par 4: HA, light sensitivity, emotional, irritable, numbness/tingling\par 3: dizziness, trouble sleeping\par 1: balance problems, sleeping more than usual, feeling slowed, difficulty concentrating + remembering

## 2019-11-07 NOTE — ASSESSMENT
[FreeTextEntry1] : 34M s/p concussion, slowly improving. Given script for PT and vestibular therapy. Patient will contact supervisor and try to see if there are other work opportunities.\par Return in 1 months.

## 2019-12-06 ENCOUNTER — APPOINTMENT (OUTPATIENT)
Dept: NEUROSURGERY | Facility: CLINIC | Age: 34
End: 2019-12-06
Payer: OTHER MISCELLANEOUS

## 2019-12-06 VITALS
WEIGHT: 268 LBS | HEIGHT: 67 IN | TEMPERATURE: 97.7 F | BODY MASS INDEX: 42.06 KG/M2 | DIASTOLIC BLOOD PRESSURE: 84 MMHG | HEART RATE: 73 BPM | OXYGEN SATURATION: 96 % | SYSTOLIC BLOOD PRESSURE: 149 MMHG

## 2019-12-06 PROCEDURE — 99214 OFFICE O/P EST MOD 30 MIN: CPT

## 2019-12-11 NOTE — HISTORY OF PRESENT ILLNESS
[FreeTextEntry1] : Mr Topete is a 34 yrs old pleasant gentleman here for follow up on his concussion. Pt works as a  and on 7/22/19 while working on the [employer], he was snagged and catapulted to a metal beam above his head, hit head and lost consciousness for about 5 minutes.. He woke up disoriented, with a headache and an left eye laceration.He was taken to  [facility] and had 3 syncopal episodes. He underwent CT c-spine, CT head, CT maxillofacial, CT chest CT abdomen and pelvis, x-ray pelvis and CXR all which were negative as per reports. He was RX Robaxin, Tylenol and Ibuprofen and sent home. Last seen in this office on 9/6/19 for concussion follow up.\par \par Today  present ambulatory accompanied with his spouse,  believes he is getting worse. Left supraorbital scar is healed but red in appearance..He reports HA  all the time mainly on left parietal area (9/10) and getting worse when he does the house work. Aleve helps a bit. Denies  speech impairment, vision problems, nausea and vomiting  or seizures. He reports having dizziness and balance problems when he stands and had to hold on to something many a times. Dizziness is worse compared to last month. Meclizine helps sometimes,but  ran out since a week. Still taking Vitamin B2 and magnesium.Has light sensitivity and keeps the dark glasses all the time.He also reports some numbness on left parietal area. Used to do stationary bike once per day, but not doing it anymore because he feels dizzy most of the time.Denies neck pain.He reports feeling slowed down and irritable and depressed all the time.\par \par Concussion score: 60 (worse from 31 last visit)\par 5: headache, more emotional than usual.irritability, numbness/tingling\par 4: light sensitivity, dizziness, balance problems,feeling slowed,\par 3:difficulty concentrating + remembering  \par 2: nausea, vomiting, sensitivity to noise, trouble sleeping,feeling like a log\par  \par

## 2019-12-11 NOTE — ASSESSMENT
[FreeTextEntry1] : Impression: \par  s/p concussion, getting worse since few weeks and has not had PT or recent VT due to lack of WCB approval.\par \par Plan:\par Continue Meclizine and Topiramate.\par PT to be continued for balance and ambulation.\par Possible ref. to Psych for depressive mood but pt declining at this time.\par Call with any issues with  medication refill.

## 2019-12-11 NOTE — REASON FOR VISIT
[Follow-Up: _____] : a [unfilled] follow-up visit [Spouse] : spouse [FreeTextEntry1] : follow up with my head injury

## 2019-12-11 NOTE — REVIEW OF SYSTEMS
[Tingling] : tingling [Numbness] : numbness [Dizziness] : dizziness [Difficulty Walking] : difficulty walking [Negative] : ENT [de-identified] : headache [FreeTextEntry9] : weakness

## 2019-12-11 NOTE — PHYSICAL EXAM
[General Appearance - Alert] : alert [General Appearance - In No Acute Distress] : in no acute distress [General Appearance - Well Nourished] : well nourished [General Appearance - Well-Appearing] : healthy appearing [Impaired Insight] : insight and judgment were intact [Affect] : the affect was normal [Memory Recent] : recent memory was not impaired [Person] : oriented to person [Place] : oriented to place [Short Term Intact] : short term memory intact [Time] : oriented to time [Cranial Nerves Optic (II)] : visual acuity intact bilaterally,  pupils equal round and reactive to light [Cranial Nerves Oculomotor (III)] : extraocular motion intact [Cranial Nerves Trigeminal (V)] : facial sensation intact symmetrically [Cranial Nerves Vestibulocochlear (VIII)] : hearing was intact bilaterally [Cranial Nerves Facial (VII)] : face symmetrical [Cranial Nerves Hypoglossal (XII)] : there was no tongue deviation with protrusion [Cranial Nerves Accessory (XI - Cranial And Spinal)] : head turning and shoulder shrug symmetric [Motor Strength] : muscle strength was normal in all four extremities [Motor Tone] : muscle tone was normal in all four extremities [Sensation Tactile Decrease] : light touch was intact [Balance] : balance was intact [PERRL With Normal Accommodation] : pupils were equal in size, round, reactive to light, with normal accommodation [Full Visual Field] : full visual field [Abnormal Walk] : normal gait [Involuntary Movements] : no involuntary movements were seen [FreeTextEntry8] : no pronated drift, instability on tandem standing

## 2019-12-17 ENCOUNTER — TRANSCRIPTION ENCOUNTER (OUTPATIENT)
Age: 34
End: 2019-12-17

## 2020-02-12 ENCOUNTER — APPOINTMENT (OUTPATIENT)
Dept: NEUROSURGERY | Facility: CLINIC | Age: 35
End: 2020-02-12
Payer: OTHER MISCELLANEOUS

## 2020-02-12 VITALS
RESPIRATION RATE: 16 BRPM | WEIGHT: 268 LBS | HEIGHT: 67 IN | OXYGEN SATURATION: 97 % | DIASTOLIC BLOOD PRESSURE: 76 MMHG | SYSTOLIC BLOOD PRESSURE: 125 MMHG | HEART RATE: 69 BPM | TEMPERATURE: 98.1 F | BODY MASS INDEX: 42.06 KG/M2

## 2020-02-12 PROCEDURE — 99213 OFFICE O/P EST LOW 20 MIN: CPT

## 2020-02-14 NOTE — PHYSICAL EXAM
[General Appearance - Alert] : alert [General Appearance - In No Acute Distress] : in no acute distress [General Appearance - Well-Appearing] : healthy appearing [General Appearance - Well Nourished] : well nourished [Oriented To Time, Place, And Person] : oriented to person, place, and time [Affect] : the affect was normal [Impaired Insight] : insight and judgment were intact [Memory Recent] : recent memory was not impaired [Person] : oriented to person [Place] : oriented to place [Time] : oriented to time [Cranial Nerves Oculomotor (III)] : extraocular motion intact [Short Term Intact] : short term memory intact [Cranial Nerves Optic (II)] : visual acuity intact bilaterally,  pupils equal round and reactive to light [Cranial Nerves Trigeminal (V)] : facial sensation intact symmetrically [Cranial Nerves Vestibulocochlear (VIII)] : hearing was intact bilaterally [Cranial Nerves Facial (VII)] : face symmetrical [Cranial Nerves Accessory (XI - Cranial And Spinal)] : head turning and shoulder shrug symmetric [Cranial Nerves Hypoglossal (XII)] : there was no tongue deviation with protrusion [Motor Tone] : muscle tone was normal in all four extremities [Motor Strength] : muscle strength was normal in all four extremities [5] : S1 flexor hallucis longus 5/5 [Sensation Pain / Temperature Decrease] : pain and temperature was intact [Sensation Tactile Decrease] : light touch was intact [Balance] : balance was intact [PERRL With Normal Accommodation] : pupils were equal in size, round, reactive to light, with normal accommodation [Full Visual Field] : full visual field [Sclera] : the sclera and conjunctiva were normal [Neck Appearance] : the appearance of the neck was normal [Outer Ear] : the ears and nose were normal in appearance [Hearing Threshold Finger Rub Not Pender] : hearing was normal [Neck Cervical Mass (___cm)] : no neck mass was observed [Exaggerated Use Of Accessory Muscles For Inspiration] : no accessory muscle use [Heart Rate And Rhythm] : heart rate was normal and rhythm regular [] : no respiratory distress [Respiration, Rhythm And Depth] : normal respiratory rhythm and effort [Abnormal Walk] : normal gait [Involuntary Movements] : no involuntary movements were seen [FreeTextEntry8] : no pronator drift noted, no instability at tandem

## 2020-02-14 NOTE — REVIEW OF SYSTEMS
[As Noted in HPI] : as noted in HPI [Numbness] : numbness [Tingling] : tingling [Negative] : Integumentary [de-identified] : headache [FreeTextEntry3] : blurred vision

## 2020-02-14 NOTE — ASSESSMENT
[FreeTextEntry1] : Impression:\par  s/p trauma to head and  concussion with LOC, balance and dizziness getting better with PT, has c/o  blurred vision \par \par Plan:\par Continue Meclizine and Topiramate and Vitamins\par PT to be continued for balance and ambulation.\par Continue home PT\par Neuro ophthalmologist referral for blurred vision.\par Call with any issues with medication refill. \par Note for excuse work for another 2 months.\par Follow up in 2 months.\par \par

## 2020-02-14 NOTE — HISTORY OF PRESENT ILLNESS
[FreeTextEntry1] : Mr Topete is a 34 yrs old pleasant gentleman here for follow up on his concussion. Pt works as a  and on 7/22/19 while working on the [employer], he was snagged and catapulted to a metal beam above his head, hit head and lost consciousness for about 5 minutes.. He woke up disoriented, with a headache and an left eye laceration.He was taken to [facility] and had 3 syncopal episodes. He underwent CT c-spine, CT head, CT maxillofacial, CT chest CT abdomen and pelvis, x-ray pelvis and CXR all which were negative as per reports. He was RX Robaxin, Tylenol and Ibuprofen and sent home. Last seen in this office on 9/6/19 for concussion follow up and advised PT and Meclizine for balance.\par \par Today Mr. jefferson ambulatory accompanied with his spouse, believes he is getting better. Left supraorbital scar is healed well.He c/o some numbness and tingling on left side of the head and face. He reports HA all the time mainly on left parietal area (9/10) , usually while getting up and getting worse when he does the house work. Aleve helps a bit. Dizziness got better compared to last month. Meclizine helps but some times dizziness come back after PT. Denies speech impairment, nausea and vomiting or seizures. He reports having Blurred vision and unable to do certain instructions during PT. Pt suggested neuro ophthalmologist, wanted to know if it is a good choice.  He still  balance problems when he stands and had to hold on to something many a times.  Still taking Vitamin B2 and magnesium,and Topamax.Has light sensitivity and keeps the dark glasses all the time. \par \par Concussion score: 34 (better  from 60 last visit)\par 6: Numbness and tingling\par 4: Headache\par 3: light sensitivity, dizziness, balance problems,trouble sleeping, feeling slowed,more emotional than usual.irritability, difficulty concentrating + remembering \par 3:none\par This is a WC case to review,   Pt works as a  and on 7/22/19 while working on the [employer], he was snagged and catapulted to a metal beam above his head, hit head and lost consciousness for about 5 minutes. He was out of work from the time of accident.

## 2020-04-20 ENCOUNTER — APPOINTMENT (OUTPATIENT)
Dept: NEUROSURGERY | Facility: CLINIC | Age: 35
End: 2020-04-20
Payer: OTHER MISCELLANEOUS

## 2020-04-20 ENCOUNTER — APPOINTMENT (OUTPATIENT)
Dept: NEUROSURGERY | Facility: CLINIC | Age: 35
End: 2020-04-20

## 2020-04-20 PROCEDURE — 99212 OFFICE O/P EST SF 10 MIN: CPT | Mod: 95

## 2020-04-20 RX ORDER — TOPIRAMATE 25 MG/1
25 TABLET, FILM COATED ORAL
Qty: 60 | Refills: 0 | Status: DISCONTINUED | COMMUNITY
Start: 2019-09-06 | End: 2020-04-20

## 2020-04-20 NOTE — HISTORY OF PRESENT ILLNESS
[Other Location: e.g. Home (Enter Location, City,State)___] : at [unfilled] [Home] : at home, [unfilled] , at the time of the visit. [Patient] : the patient [Self] : self [FreeTextEntry1] : Mr Topete is a 34 yrs old pleasant gentleman here for follow up on his concussion. Pt works as a  and on 7/22/19 while working on the [employer], he was snagged and catapulted to a metal beam above his head, hit head and lost consciousness for about 5 minutes.. He woke up disoriented, with a headache and an left eye laceration.He was taken to [facility] and had 3 syncopal episodes. He underwent CT c-spine, CT head, CT maxillofacial, CT chest CT abdomen and pelvis, x-ray pelvis and CXR all which were negative as per reports.\par \par Today, pt reports feeing better overall.  He has been doing PT and working out at home.  Less blurred vision.  He was just approved for more 24 more PT sessions.  He does still have significant headaches. He takes his meds in the morning with some aspirin.  He wakes up with headache, which can be 10/10, then dissipates to 4/10 over the course of the day.  Someone recommended nortriptyline.  Pt ran out of the Topamax 2 weeks ago, but this was not helping anyway.  The vitamins help give him more energy. He has not gotten approval for the neuroophthalmologist as yet [Father] : father [FreeTextEntry4] : MICHAEL Moura is present during the tele health visit

## 2020-04-20 NOTE — ASSESSMENT
[FreeTextEntry1] : Pt s/p concussion doing better, but still with headaches.  Will not renew Topamax, but will start nortriptyline 25 mg qHS to see if this helps his AM headaches.\par Return in 2 months for follow up.\par \par Time:  10:15-10:27 AM (13 minutes)

## 2020-04-20 NOTE — HISTORY OF PRESENT ILLNESS
[Other Location: e.g. Home (Enter Location, City,State)___] : at [unfilled] [Home] : at home, [unfilled] , at the time of the visit. [Patient] : the patient [Self] : self [FreeTextEntry1] : Mr Topete is a 34 yrs old pleasant gentleman here for follow up on his concussion. Pt works as a  and on 7/22/19 while working on the [employer], he was snagged and catapulted to a metal beam above his head, hit head and lost consciousness for about 5 minutes.. He woke up disoriented, with a headache and an left eye laceration.He was taken to [facility] and had 3 syncopal episodes. He underwent CT c-spine, CT head, CT maxillofacial, CT chest CT abdomen and pelvis, x-ray pelvis and CXR all which were negative as per reports.\par \par Today, pt reports feeing better overall.  He has been doing PT and working out at home.  Less blurred vision.  He was just approved for more 24 more PT sessions.  He does still have significant headaches. He takes his meds in the morning with some aspirin.  He wakes up with headache, which can be 10/10, then dissipates to 4/10 over the course of the day.  Someone recommended nortriptyline.  Pt ran out of the Topamax 2 weeks ago, but this was not helping anyway.  The vitamins help give him more energy. He has not gotten approval for the neuroophthalmologist as yet [Father] : father [FreeTextEntry4] : MICHAEL Moura is present during the tele health visit

## 2020-06-22 ENCOUNTER — APPOINTMENT (OUTPATIENT)
Dept: NEUROSURGERY | Facility: CLINIC | Age: 35
End: 2020-06-22

## 2020-06-22 ENCOUNTER — APPOINTMENT (OUTPATIENT)
Dept: NEUROSURGERY | Facility: CLINIC | Age: 35
End: 2020-06-22
Payer: OTHER MISCELLANEOUS

## 2020-06-22 PROCEDURE — 99213 OFFICE O/P EST LOW 20 MIN: CPT | Mod: 95

## 2020-06-22 RX ORDER — NORTRIPTYLINE HYDROCHLORIDE 10 MG/1
10 CAPSULE ORAL
Qty: 45 | Refills: 3 | Status: ACTIVE | COMMUNITY
Start: 2020-06-22 | End: 1900-01-01

## 2020-06-22 NOTE — PHYSICAL EXAM
[General Appearance - Alert] : alert [General Appearance - Well-Appearing] : healthy appearing [General Appearance - In No Acute Distress] : in no acute distress [FreeTextEntry1] : BAYLEE

## 2020-06-22 NOTE — HISTORY OF PRESENT ILLNESS
[Home] : at home, [unfilled] , at the time of the visit. [Medical Office: ([facility])___] : at the medical office located in  [Verbal consent obtained from patient] : the patient, [unfilled] [FreeTextEntry1] : Mr Topete is a 34 yrs old pleasant gentleman here for follow up on his concussion. Pt works as a  and on 7/22/19 while working on the [employer], he was snagged and catapulted to a metal beam above his head, hit head and lost consciousness for about 5 minutes.. He woke up disoriented, with a headache and an left eye laceration.He was taken to [facility] and had 3 syncopal episodes. He underwent CT c-spine, CT head, CT maxillofacial, CT chest CT abdomen and pelvis, x-ray pelvis and CXR all which were negative as per reports.\par \par Today he reports he is doing better on the Nortriptyline.  The only downside is that he is more drowsy.  HE has been going down on it slowly.  He did PT and stopped last week.  they were really pushing him, on treadmill, head movements helped the dizziness. He feels the headache, but not all day, in the morning and then it goes away.  Comes and goes.  He has been trying to use a machine at home.  Not able to drive, he does not want to drive with the feeling of dizziness.  He has stopped the meclizine, not really working.  Takes the Mg, B2.

## 2020-06-22 NOTE — ASSESSMENT
[FreeTextEntry1] : Pt is doing better with headache and dizziness abating with balance therapy and Nortriptyline.  The dose of 25 mg may be too high for pt and will decrease dose to one or two 10 mg tabs to see if this helps with drowsiness.  \par Rd Nor  10 mg one or two tablets qHS\par Rx PT for balance.\par Return in 2 months.\par Encourage aerobic exercise.  he walks with daughter.\par \par 10:45-11:00 (15 min)

## 2020-07-21 NOTE — ED ADULT NURSE NOTE - PAIN RATING/NUMBER SCALE (0-10): REST
Detail Level: Detailed Quality 110: Preventive Care And Screening: Influenza Immunization: Influenza Immunization Administered during Influenza season 10

## 2020-07-30 ENCOUNTER — TRANSCRIPTION ENCOUNTER (OUTPATIENT)
Age: 35
End: 2020-07-30

## 2020-08-21 ENCOUNTER — APPOINTMENT (OUTPATIENT)
Dept: NEUROSURGERY | Facility: CLINIC | Age: 35
End: 2020-08-21
Payer: OTHER MISCELLANEOUS

## 2020-08-21 VITALS
BODY MASS INDEX: 42.06 KG/M2 | HEIGHT: 67 IN | OXYGEN SATURATION: 96 % | WEIGHT: 268 LBS | TEMPERATURE: 97.1 F | HEART RATE: 66 BPM | RESPIRATION RATE: 16 BRPM | SYSTOLIC BLOOD PRESSURE: 106 MMHG | DIASTOLIC BLOOD PRESSURE: 77 MMHG

## 2020-08-21 PROCEDURE — 99213 OFFICE O/P EST LOW 20 MIN: CPT

## 2020-08-24 NOTE — HISTORY OF PRESENT ILLNESS
[FreeTextEntry1] : Mr Topete is a 34 yrs old pleasant gentleman here for follow up on his concussion. Pt works as a  and on 7/22/19 while working on the [employer], he was snagged and catapulted to a metal beam above his head, hit head and lost consciousness for about 5 minutes.. He woke up disoriented, with a headache and an left eye laceration.He was taken to [facility] and had 3 syncopal episodes. He underwent CT c-spine, CT head, CT maxillofacial, CT chest CT abdomen and pelvis, x-ray pelvis and CXR all which were negative as per reports.\par \par Today he reports he is doing better. Still has some HA especially when the day progress.  He did PT in the facility and [facility] is no more covering and stopped last week.  He has been trying to use a machine at home. Dizziness getting better. Not able to drive, he does not want to drive with the feeling of dizziness. He has stopped the meclizine, not really working. Takes the Mg, B2

## 2020-08-24 NOTE — REVIEW OF SYSTEMS
[Dizziness] : dizziness [As Noted in HPI] : as noted in HPI [Lightheadedness] : lightheadedness [Negative] : Gastrointestinal [Inability to Walk] : able to walk [de-identified] : headache

## 2020-08-24 NOTE — PHYSICAL EXAM
[General Appearance - Alert] : alert [General Appearance - In No Acute Distress] : in no acute distress [General Appearance - Well Nourished] : well nourished [General Appearance - Well-Appearing] : healthy appearing [Oriented To Time, Place, And Person] : oriented to person, place, and time [Affect] : the affect was normal [Memory Recent] : recent memory was not impaired [Person] : oriented to person [Time] : oriented to time [Place] : oriented to place [Cranial Nerves Optic (II)] : visual acuity intact bilaterally,  pupils equal round and reactive to light [Cranial Nerves Oculomotor (III)] : extraocular motion intact [Cranial Nerves Vestibulocochlear (VIII)] : hearing was intact bilaterally [Cranial Nerves Accessory (XI - Cranial And Spinal)] : head turning and shoulder shrug symmetric [Motor Tone] : muscle tone was normal in all four extremities [Motor Strength] : muscle strength was normal in all four extremities [Involuntary Movements] : no involuntary movements were seen [Abnormal Walk] : normal gait [Balance] : balance was intact [Sclera] : the sclera and conjunctiva were normal [Outer Ear] : the ears and nose were normal in appearance [Neck Appearance] : the appearance of the neck was normal [] : no respiratory distress [Apical Impulse] : the apical impulse was normal [Arterial Pulses Carotid] : carotid pulses were normal with no bruits [Bowel Sounds] : normal bowel sounds [FreeTextEntry8] : no imbalance while walking, good balance tandem stance eyes open and closed.

## 2020-08-24 NOTE — ASSESSMENT
[FreeTextEntry1] : Impression:\par \par Pt is doing better with headache and dizziness working with balance therapy at home and Nortriptyline. Getting better over time with his symptoms. \par \par Plans:\par \par Continue Amitriptyline 10mg HS\par Continue PT at home for balance.\par Encouraged aerobic exercises\par Return in 2 months.

## 2020-10-23 ENCOUNTER — APPOINTMENT (OUTPATIENT)
Dept: NEUROSURGERY | Facility: CLINIC | Age: 35
End: 2020-10-23
Payer: OTHER MISCELLANEOUS

## 2020-10-23 VITALS
HEART RATE: 71 BPM | HEIGHT: 67 IN | TEMPERATURE: 97.3 F | BODY MASS INDEX: 42.06 KG/M2 | SYSTOLIC BLOOD PRESSURE: 133 MMHG | RESPIRATION RATE: 16 BRPM | WEIGHT: 268 LBS | DIASTOLIC BLOOD PRESSURE: 71 MMHG | OXYGEN SATURATION: 95 %

## 2020-10-23 PROCEDURE — 99213 OFFICE O/P EST LOW 20 MIN: CPT

## 2020-10-23 PROCEDURE — 99072 ADDL SUPL MATRL&STAF TM PHE: CPT

## 2020-10-23 NOTE — PHYSICAL EXAM
[FreeTextEntry1] : BEACH well, gait normal. [General Appearance - Alert] : alert [General Appearance - In No Acute Distress] : in no acute distress

## 2020-10-23 NOTE — ASSESSMENT
[FreeTextEntry1] : Pt w/p concussion with slowly improving symptoms is weaning himself off of the Pamelor.  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

## 2020-10-23 NOTE — HISTORY OF PRESENT ILLNESS
[FreeTextEntry1] : Mr Topete is a 34 yrs old pleasant gentleman here for follow up on his concussion. Pt works as a  and on 7/22/19 while working on the [employer], he was snagged and catapulted to a metal beam above his head, hit head and lost consciousness for about 5 minutes.. He woke up disoriented, with a headache and an left eye laceration.He was taken to [facility] and had 3 syncopal episodes. He underwent CT c-spine, CT head, CT maxillofacial, CT chest CT abdomen and pelvis, x-ray pelvis and CXR all which were negative as per reports.\par \par Today, pt reports feeling alright.  Has roberto working on endurance in exercise and has been taking half of of the Nortriptyline, now 5 mg.  Doing daily walks.  The headaches are still there.  Headaches get most of the day, no the whole day, left frontal and parietal.  Lost 5 pounds.  Does not have dizziness.  No longer takes Meclizine.  Still taking the vitamins.  Headaches start in the morning.  For several months, trying to keep up with a schedule.  Feels more calm and positive.\par \par Concussion Score: 24\par 5:  Headache\par 4: numbness or tingling (left forehead)\par 2:  Nausea, vomiting, dizziness, irritability, difficulty with concentrating and memory\par 1:  light sensitivity, noise sensitivity

## 2020-10-27 ENCOUNTER — NON-APPOINTMENT (OUTPATIENT)
Age: 35
End: 2020-10-27

## 2020-12-17 NOTE — HISTORY OF PRESENT ILLNESS
[FreeTextEntry1] : Mr Topete is a 34 yrs old pleasant gentleman here for follow up on his concussion. Pt works as a  and on 7/22/19 while working on the [employer], he was snagged and catapulted to a metal beam above his head, hit head and lost consciousness for about 5 minutes.. He woke up disoriented, with a headache and an left eye laceration.He was taken to [facility] and had 3 syncopal episodes. He underwent CT c-spine, CT head, CT maxillofacial, CT chest CT abdomen and pelvis, x-ray pelvis and CXR all which were negative as per reports. Pt had multiple follow up in thia office and was slowly recovering from concussion. Last seen on 10/23/2020.  \par \par

## 2020-12-18 ENCOUNTER — APPOINTMENT (OUTPATIENT)
Dept: NEUROSURGERY | Facility: CLINIC | Age: 35
End: 2020-12-18

## 2021-02-02 RX ORDER — NORTRIPTYLINE HYDROCHLORIDE 25 MG/1
25 CAPSULE ORAL
Qty: 30 | Refills: 1 | Status: ACTIVE | COMMUNITY
Start: 2020-04-20 | End: 1900-01-01

## 2021-02-05 ENCOUNTER — APPOINTMENT (OUTPATIENT)
Dept: NEUROSURGERY | Facility: CLINIC | Age: 36
End: 2021-02-05
Payer: OTHER MISCELLANEOUS

## 2021-02-05 PROCEDURE — 99212 OFFICE O/P EST SF 10 MIN: CPT | Mod: 95

## 2021-02-05 NOTE — PHYSICAL EXAM
[General Appearance - Alert] : alert [General Appearance - In No Acute Distress] : in no acute distress [General Appearance - Well Nourished] : well nourished [General Appearance - Well-Appearing] : healthy appearing [Oriented To Time, Place, And Person] : oriented to person, place, and time [Impaired Insight] : insight and judgment were intact [Affect] : the affect was normal [Memory Recent] : recent memory was not impaired

## 2021-02-08 NOTE — HISTORY OF PRESENT ILLNESS
[Home] : at home, [unfilled] , at the time of the visit. [Medical Office: ([facility])___] : at the medical office located in  [Other:____] : [unfilled] [Verbal consent obtained from patient] : the patient, [unfilled] [FreeTextEntry1] : Mr Topete is a 34 yrs old pleasant gentleman here for follow up on his concussion. Pt works as a  and on 7/22/19 while working on the [employer], he was snagged and catapulted to a metal beam above his head, hit head and lost consciousness for about 5 minutes.. He woke up disoriented, with a headache and an left eye laceration.He was taken to [facility] and had 3 syncopal episodes. He underwent CT c-spine, CT head, CT maxillofacial, CT chest CT abdomen and pelvis, x-ray pelvis and CXR all which were negative as per reports. Pt had multiple follow up in thia office and was slowly recovering from concussion. Last seen on 10/23/2020. \par \par Today, pt reports feeling the same. The headaches are still there. Headaches get most of the day, get dizziness when the pain is bad. HAs are mainly in the left frontal and parietal.  No longer takes Meclizine. Still taking the vitamins. Headaches start in the morning for several months. Topomax did not help him, now on Amitriptyline at night.  But he is walking at home, doing daily chores.PT has not approved yet by [facility].

## 2021-02-08 NOTE — ASSESSMENT
[FreeTextEntry1] : \par IMPRESSION:\par Pt w/p concussion still with HAs and dizziness. PT has not approved yet. Will apply again.\par \par PLAN:\par Continue activity and using computer to enhance learning and concentration.\par PT for dizziness and balance 2-3 times/week, will apply through [facility]\par Refer to head ache specialist neurologist, will send referral script and give information.\par Return in 3 months for reevaluation. \par \par Tele from 12:00 to 12;15 (15 min)

## 2021-02-09 ENCOUNTER — NON-APPOINTMENT (OUTPATIENT)
Age: 36
End: 2021-02-09

## 2021-04-03 NOTE — ED ADULT NURSE NOTE - BREATH SOUNDS, MLM
pt found asleep in bed, mother present, +easily arousable, +following commands, now awake, +answering questions appropriately, julio perrl 4 mm, clear speech, maex4, no facial asymmetry noted, met with ED resident at bedside, +pt eating crackers and drinking juice Clear

## 2021-06-11 ENCOUNTER — APPOINTMENT (OUTPATIENT)
Dept: NEUROSURGERY | Facility: CLINIC | Age: 36
End: 2021-06-11
Payer: OTHER MISCELLANEOUS

## 2021-06-11 VITALS
DIASTOLIC BLOOD PRESSURE: 79 MMHG | HEART RATE: 80 BPM | SYSTOLIC BLOOD PRESSURE: 131 MMHG | TEMPERATURE: 98 F | HEIGHT: 67 IN | BODY MASS INDEX: 42.06 KG/M2 | OXYGEN SATURATION: 98 % | WEIGHT: 268 LBS

## 2021-06-11 PROCEDURE — 99214 OFFICE O/P EST MOD 30 MIN: CPT

## 2021-06-11 PROCEDURE — 99072 ADDL SUPL MATRL&STAF TM PHE: CPT

## 2021-06-11 NOTE — HISTORY OF PRESENT ILLNESS
[FreeTextEntry1] : Mr Topete is a 34 yrs old pleasant gentleman here for follow up on his concussion. Pt works as a  and on 7/22/19 while working on the [employer], he was snagged and catapulted to a metal beam above his head, hit head and lost consciousness for about 5 minutes.. He woke up disoriented, with a headache and an left eye laceration.He was taken to [facility] and had 3 syncopal episodes. He underwent CT c-spine, CT head, CT maxillofacial, CT chest CT abdomen and pelvis, x-ray pelvis and CXR all which were negative as per reports. Pt had multiple follow up in thia office and was slowly recovering from concussion. Last seen on 2/5/21.\par \par Today, pt reports feeling the same. The headaches are still there. Headaches get most of the day, all the time, not taking Topamax now, on Amitriptyline at night.. He gets dizziness when the headache is bad. HAs are mainly in the left frontal and parietal.  Still taking the vitamins. Headaches start in the morning for several months. ,  But he is walking at home, doing daily chores with some assistance. Doing PT consistently with good result.  He has been off Topamax for 3 weeks.

## 2021-06-11 NOTE — PHYSICAL EXAM
[General Appearance - Alert] : alert [General Appearance - In No Acute Distress] : in no acute distress [General Appearance - Well Nourished] : well nourished [General Appearance - Well-Appearing] : healthy appearing [Oriented To Time, Place, And Person] : oriented to person, place, and time [Impaired Insight] : insight and judgment were intact [Affect] : the affect was normal [Memory Recent] : recent memory was not impaired [Person] : oriented to person [Place] : oriented to place [Time] : oriented to time [Short Term Intact] : short term memory intact [Cranial Nerves Optic (II)] : visual acuity intact bilaterally,  pupils equal round and reactive to light [Cranial Nerves Oculomotor (III)] : extraocular motion intact [Cranial Nerves Trigeminal (V)] : facial sensation intact symmetrically [Cranial Nerves Facial (VII)] : face symmetrical [Cranial Nerves Vestibulocochlear (VIII)] : hearing was intact bilaterally [Cranial Nerves Glossopharyngeal (IX)] : tongue and palate midline [Cranial Nerves Accessory (XI - Cranial And Spinal)] : head turning and shoulder shrug symmetric [Cranial Nerves Hypoglossal (XII)] : there was no tongue deviation with protrusion [Motor Tone] : muscle tone was normal in all four extremities [Motor Strength] : muscle strength was normal in all four extremities [Involuntary Movements] : no involuntary movements were seen [Sensation Tactile Decrease] : light touch was intact [Sensation Pain / Temperature Decrease] : pain and temperature was intact [Sclera] : the sclera and conjunctiva were normal [PERRL With Normal Accommodation] : pupils were equal in size, round, reactive to light, with normal accommodation [Outer Ear] : the ears and nose were normal in appearance [Both Tympanic Membranes Were Examined] : both tympanic membranes were normal [Neck Appearance] : the appearance of the neck was normal [] : no respiratory distress [Respiration, Rhythm And Depth] : normal respiratory rhythm and effort [Apical Impulse] : the apical impulse was normal [Heart Rate And Rhythm] : heart rate was normal and rhythm regular [Arterial Pulses Carotid] : carotid pulses were normal with no bruits [Abdominal Aorta] : the abdominal aorta was normal [Musculoskeletal - Swelling] : no joint swelling seen [Skin Color & Pigmentation] : normal skin color and pigmentation [Abnormal Walk] : normal gait [Balance] : balance was intact [FreeTextEntry8] : no pronator drift, no imbalance on tandem

## 2021-06-11 NOTE — ASSESSMENT
[FreeTextEntry1] : IMPRESSION:\par Pt with TBI w/p concussion still with HAs and dizziness.HAs are not relieved with Topamax, not successful getting a headache specialist covers [facility]. Pt doing PT for balance with significant improvement in walking.\par \par PLAN:\par Continue Topamax 25 mg BID until the neurologist expert advise.\par Continue activity and using computer to enhance learning and concentration.\par Continue PT for dizziness and balance 2-3 times/week, will apply through [facility]\par Find a  head ache specialist neurologist, will try through concussion hot line or through pt's own insurance.\par Return in 2 months for re evaluation. \par \par

## 2021-06-17 ENCOUNTER — NON-APPOINTMENT (OUTPATIENT)
Age: 36
End: 2021-06-17

## 2021-07-29 ENCOUNTER — NON-APPOINTMENT (OUTPATIENT)
Age: 36
End: 2021-07-29

## 2021-08-13 ENCOUNTER — APPOINTMENT (OUTPATIENT)
Dept: NEUROSURGERY | Facility: CLINIC | Age: 36
End: 2021-08-13
Payer: OTHER MISCELLANEOUS

## 2021-08-13 ENCOUNTER — APPOINTMENT (OUTPATIENT)
Dept: NEUROSURGERY | Facility: CLINIC | Age: 36
End: 2021-08-13

## 2021-08-13 VITALS
HEIGHT: 71 IN | BODY MASS INDEX: 37.1 KG/M2 | SYSTOLIC BLOOD PRESSURE: 106 MMHG | OXYGEN SATURATION: 97 % | WEIGHT: 265 LBS | HEART RATE: 67 BPM | DIASTOLIC BLOOD PRESSURE: 71 MMHG

## 2021-08-13 DIAGNOSIS — M54.2 CERVICALGIA: ICD-10-CM

## 2021-08-13 PROCEDURE — 99072 ADDL SUPL MATRL&STAF TM PHE: CPT

## 2021-08-13 PROCEDURE — 99214 OFFICE O/P EST MOD 30 MIN: CPT

## 2021-08-13 NOTE — ASSESSMENT
[FreeTextEntry1] : IMPRESSION:\par Pt with TBI w/p concussion still with HAs, dizziness and neck pain. HAs are getting better with Amitriptiline 100 mg as advised by the neurologist. Pt doing PT for balance and dizziness with significant improvement in walking.Pt has neck pain at low occipital with no radiculopathy with mild restriction of ROM to right. Pt needs PT for neck for improvement in ROM and other symptoms.Will re evaluate in 2 months to see the need of any imaging.\par \par PLAN:\par Continue Amitriptyline 100 mg as advised by the neurologist\par Continue activity and using computer to enhance learning and concentration.\par Continue VT for dizziness and balance 2-3 times/week, will apply through [facility]\par PT for neck  for strengthening and modalities , 2-3 times/week x 8 weeks. will apply through [facility]\par Return in 2 months for re evaluation. \par

## 2021-08-13 NOTE — HISTORY OF PRESENT ILLNESS
[FreeTextEntry1] : Mr Topete is a 34 yrs old pleasant gentleman here for follow up on his concussion. Pt works as a  and on 7/22/19 while working on the [employer], he was snagged and catapulted to a metal beam above his head, hit head and lost consciousness for about 5 minutes.. He woke up disoriented, with a headache and an left eye laceration.He was taken to [facility] and had 3 syncopal episodes. He underwent CT c-spine, CT head, CT maxillofacial, CT chest CT abdomen and pelvis, x-ray pelvis and CXR all which were negative as per reports. Pt had multiple follow up in thia office and was slowly recovering from concussion. Last seen on 2/5/21.\par \par Today, pt reports feeling the same. The headaches are still there with varying duration and intensity. He gets dizziness when the headache is bad.His headaches are managed buy Neurologist Dr. Chacon. He now takes Amitriptyline 100 mg as per the neurologist.   HAs are mainly in the left frontal and parietal can go up to 8/10 some time. He continuing to take vitamins. Headaches start in the morning for several months.. Pt also report neck pain at the let side more towards the left occipital region. Pt has some restriction of movement on the neck to the right side. Pain in the neck is not radiating to the arms or hands. Pt also denies numbness or tingling. But he is walking more in the evening, playing brain games, doing daily chores with some assistance. Doing PT,massage therapy and VT consistently with good result. \par \par \par Neurologist: Crispin Chacon, neurologist [address] [place] [address] Ph: [phone]\par

## 2021-08-13 NOTE — PHYSICAL EXAM
[General Appearance - Alert] : alert [General Appearance - In No Acute Distress] : in no acute distress [General Appearance - Well Nourished] : well nourished [General Appearance - Well-Appearing] : healthy appearing [Oriented To Time, Place, And Person] : oriented to person, place, and time [Impaired Insight] : insight and judgment were intact [Affect] : the affect was normal [Memory Recent] : recent memory was not impaired [Person] : oriented to person [Place] : oriented to place [Time] : oriented to time [Short Term Intact] : short term memory intact [Cranial Nerves Optic (II)] : visual acuity intact bilaterally,  pupils equal round and reactive to light [Cranial Nerves Oculomotor (III)] : extraocular motion intact [Cranial Nerves Trigeminal (V)] : facial sensation intact symmetrically [Cranial Nerves Facial (VII)] : face symmetrical [Cranial Nerves Vestibulocochlear (VIII)] : hearing was intact bilaterally [Cranial Nerves Glossopharyngeal (IX)] : tongue and palate midline [Cranial Nerves Accessory (XI - Cranial And Spinal)] : head turning and shoulder shrug symmetric [Cranial Nerves Hypoglossal (XII)] : there was no tongue deviation with protrusion [Motor Tone] : muscle tone was normal in all four extremities [Motor Strength] : muscle strength was normal in all four extremities [Sensation Tactile Decrease] : light touch was intact [Sensation Pain / Temperature Decrease] : pain and temperature was intact [Balance] : balance was intact [1+] : Triceps left 1+ [2+] : Ankle jerk left 2+ [Sclera] : the sclera and conjunctiva were normal [PERRL With Normal Accommodation] : pupils were equal in size, round, reactive to light, with normal accommodation [Outer Ear] : the ears and nose were normal in appearance [Both Tympanic Membranes Were Examined] : both tympanic membranes were normal [Neck Appearance] : the appearance of the neck was normal [] : no respiratory distress [Respiration, Rhythm And Depth] : normal respiratory rhythm and effort [Apical Impulse] : the apical impulse was normal [Heart Rate And Rhythm] : heart rate was normal and rhythm regular [Arterial Pulses Carotid] : carotid pulses were normal with no bruits [Abdominal Aorta] : the abdominal aorta was normal [No Spinal Tenderness] : no spinal tenderness [Abnormal Walk] : normal gait [Involuntary Movements] : no involuntary movements were seen [Skin Color & Pigmentation] : normal skin color and pigmentation [FreeTextEntry8] : no drift, no imbalance on tandem [Restricted] : was not restricted [Pain] : was painless

## 2021-10-15 ENCOUNTER — APPOINTMENT (OUTPATIENT)
Dept: NEUROSURGERY | Facility: CLINIC | Age: 36
End: 2021-10-15

## 2021-12-13 NOTE — ED PROVIDER NOTE - DISCHARGE DATE
Body Location Override (Optional - Billing Will Still Be Based On Selected Body Map Location If Applicable): left superior posterior helix Mohs Case Number: [id number] 09-Mar-2018 Date Of Previous Biopsy (Optional): 10/19/21 Previous Accession (Optional): [id number] Biopsy Photograph Reviewed: Yes Consent Type: Consent 1 (Standard) Eye Shield Used: No Surgeon Performing Repair (Optional): Erich Hurt, MD Initial Size Of Lesion: 0.8 X Size Of Lesion In Cm (Optional): 0 Number Of Stages: 4 Primary Defect Length In Cm (Final Defect Size - Required For Flaps/Grafts): 1.7 Primary Defect Width In Cm (Final Defect Size - Required For Flaps/Grafts): 1.5 Repair Type: Flap Oculoplastic Surgeon Procedure Text (A): After obtaining clear surgical margins the patient was sent to oculoplastics for surgical repair.  The patient understands they will receive post-surgical care and follow-up from the referring physician's office. Oculoplastic Surgeon Procedure Text (B): After obtaining clear surgical margins the patient was sent to oculoplastics for surgical repair.  The patient understands they will receive post-surgical care and follow-up from the referring physician's office. Otolaryngologist Procedure Text (A): After obtaining clear surgical margins the patient was sent to otolaryngology for surgical repair.  The patient understands they will receive post-surgical care and follow-up from the referring physician's office. Otolaryngologist Procedure Text (B): After obtaining clear surgical margins the patient was sent to otolaryngology for surgical repair.  The patient understands they will receive post-surgical care and follow-up from the referring physician's office. Plastic Surgeon Procedure Text (A): After obtaining clear surgical margins the patient was sent to plastics for surgical repair.  The patient understands they will receive post-surgical care and follow-up from the referring physician's office. Plastic Surgeon Procedure Text (B): After obtaining clear surgical margins the patient was sent to plastics for surgical repair.  The patient understands they will receive post-surgical care and follow-up from the referring physician's office. Mid-Level Procedure Text (A): After obtaining clear surgical margins the patient was sent to a mid-level provider for surgical repair.  The patient understands they will receive post-surgical care and follow-up from the mid-level provider. Mid-Level Procedure Text (B): After obtaining clear surgical margins the patient was sent to a mid-level provider for surgical repair.  The patient understands they will receive post-surgical care and follow-up from the mid-level provider. Provider Procedure Text (A): After obtaining clear surgical margins the defect was repaired by another provider. Asc Procedure Text (A): After obtaining clear surgical margins the patient was sent to an ASC for surgical repair.  The patient understands they will receive post-surgical care and follow-up from the ASC physician. Asc Procedure Text (B): After obtaining clear surgical margins the patient was sent to an ASC for surgical repair.  The patient understands they will receive post-surgical care and follow-up from the ASC physician. Suturegard Retention Suture: 2-0 Nylon Retention Suture Bite Size: 3 mm Length To Time In Minutes Device Was In Place: 10 Number Of Hemigard Strips Per Side: 1 Undermining Type: Entire Wound Debridement Text: The wound edges were debrided prior to proceeding with the closure to facilitate wound healing. Helical Rim Text: The closure involved the helical rim. Vermilion Border Text: The closure involved the vermilion border. Nostril Rim Text: The closure involved the nostril rim. Retention Suture Text: Retention sutures were placed to support the closure and prevent dehiscence. Flap Type: Ear Star Wedge Flap Secondary Defect Length In Cm (Required For Flaps): 2.5 Location Indication Override (Is Already Calculated Based On Selected Body Location): Area H Area H Indication Text: Tumors in this location are included in Area H (eyelids, ears, nose, lips, chin, ear, pre-auricular, post-auricular, temple, genitalia, hands, feet, ankles and areola).  Tissue conservation is critical in these anatomic locations.b Area M Indication Text: Tumors in this location are included in Area M (cheek, forehead, scalp, neck, jawline and pretibial skin).  Mohs surgery is indicated for tumors in these anatomic locations. Area L Indication Text: Tumors in this location are included in Area L (trunk and extremities).  Mohs surgery is indicated for larger tumors, or tumors with aggressive histologic features, in these anatomic locations. Depth Of Tumor Invasion (For Histology): dermis Perineural Invasion (For Histology - Be Specific If Possible): absent Surgical Defect Length In Cm (Optional): 1.1 Surgical Defect Width In Cm (Optional): .08 Special Stains Stage 1 - Results: Base On Clearance Noted Above Histology Selection Override (Optional- Will Default To Parent Diagnosis If N/A): Squamous Cell Carcinoma in situ Stage 2: Additional Anesthesia Volume In Cc: .5 Stage 2: Additional Anesthesia Type: 1% lidocaine with epinephrine Surgical Defect Length In Cm (Optional): 1.3 Surgical Defect Width In Cm (Optional): 1.0 Include Tumor Staging In Mohs Note?: Please Select the Appropriate Response Staging Info: By selecting yes to the question above you will include information on AJCC 8 tumor staging in your Mohs note. Information on tumor staging will be automatically added for SCCs on the head and neck. AJCC 8 includes tumor size, tumor depth, perineural involvement and bone invasion. Tumor Depth: Less than 6mm from granular layer and no invasion beyond the subcutaneous fat Medical Necessity Statement: Based on my medical judgement, Mohs surgery is the most appropriate treatment for this cancer compared to other treatments. Alternatives Discussed Intro (Do Not Add Period): I discussed alternative treatments to Mohs surgery and specifically discussed the risks and benefits of Consent 1/Introductory Paragraph: The rationale for Mohs was explained to the patient and consent was obtained. The risks, benefits and alternatives to therapy were discussed in detail. Specifically, the risks of infection, scarring, bleeding, prolonged wound healing, incomplete removal, allergy to anesthesia, nerve injury and recurrence were addressed. Prior to the procedure, the treatment site was clearly identified and confirmed by the patient. All components of Universal Protocol/PAUSE Rule completed. Consent 2/Introductory Paragraph: Mohs surgery was explained to the patient and consent was obtained. The risks, benefits and alternatives to therapy were discussed in detail. Specifically, the risks of infection, scarring, bleeding, prolonged wound healing, incomplete removal, allergy to anesthesia, nerve injury and recurrence were addressed. Prior to the procedure, the treatment site was clearly identified and confirmed by the patient. All components of Universal Protocol/PAUSE Rule completed. Consent 3/Introductory Paragraph: I gave the patient a chance to ask questions they had about the procedure.  Following this I explained the Mohs procedure and consent was obtained. The risks, benefits and alternatives to therapy were discussed in detail. Specifically, the risks of infection, scarring, bleeding, prolonged wound healing, incomplete removal, allergy to anesthesia, nerve injury and recurrence were addressed. Prior to the procedure, the treatment site was clearly identified and confirmed by the patient. All components of Universal Protocol/PAUSE Rule completed. Consent (Temporal Branch)/Introductory Paragraph: The rationale for Mohs was explained to the patient and consent was obtained. The risks, benefits and alternatives to therapy were discussed in detail. Specifically, the risks of damage to the temporal branch of the facial nerve, infection, scarring, bleeding, prolonged wound healing, incomplete removal, allergy to anesthesia, and recurrence were addressed. Prior to the procedure, the treatment site was clearly identified and confirmed by the patient. All components of Universal Protocol/PAUSE Rule completed. Consent (Marginal Mandibular)/Introductory Paragraph: The rationale for Mohs was explained to the patient and consent was obtained. The risks, benefits and alternatives to therapy were discussed in detail. Specifically, the risks of damage to the marginal mandibular branch of the facial nerve, infection, scarring, bleeding, prolonged wound healing, incomplete removal, allergy to anesthesia, and recurrence were addressed. Prior to the procedure, the treatment site was clearly identified and confirmed by the patient. All components of Universal Protocol/PAUSE Rule completed. Consent (Spinal Accessory)/Introductory Paragraph: The rationale for Mohs was explained to the patient and consent was obtained. The risks, benefits and alternatives to therapy were discussed in detail. Specifically, the risks of damage to the spinal accessory nerve, infection, scarring, bleeding, prolonged wound healing, incomplete removal, allergy to anesthesia, and recurrence were addressed. Prior to the procedure, the treatment site was clearly identified and confirmed by the patient. All components of Universal Protocol/PAUSE Rule completed. Consent (Near Eyelid Margin)/Introductory Paragraph: The rationale for Mohs was explained to the patient and consent was obtained. The risks, benefits and alternatives to therapy were discussed in detail. Specifically, the risks of ectropion or eyelid deformity, infection, scarring, bleeding, prolonged wound healing, incomplete removal, allergy to anesthesia, nerve injury and recurrence were addressed. Prior to the procedure, the treatment site was clearly identified and confirmed by the patient. All components of Universal Protocol/PAUSE Rule completed. Consent (Ear)/Introductory Paragraph: The rationale for Mohs was explained to the patient and consent was obtained. The risks, benefits and alternatives to therapy were discussed in detail. Specifically, the risks of ear deformity, infection, scarring, bleeding, prolonged wound healing, incomplete removal, allergy to anesthesia, nerve injury and recurrence were addressed. Prior to the procedure, the treatment site was clearly identified and confirmed by the patient. All components of Universal Protocol/PAUSE Rule completed. Consent (Nose)/Introductory Paragraph: The rationale for Mohs was explained to the patient and consent was obtained. The risks, benefits and alternatives to therapy were discussed in detail. Specifically, the risks of nasal deformity, changes in the flow of air through the nose, infection, scarring, bleeding, prolonged wound healing, incomplete removal, allergy to anesthesia, nerve injury and recurrence were addressed. Prior to the procedure, the treatment site was clearly identified and confirmed by the patient. All components of Universal Protocol/PAUSE Rule completed. Consent (Lip)/Introductory Paragraph: The rationale for Mohs was explained to the patient and consent was obtained. The risks, benefits and alternatives to therapy were discussed in detail. Specifically, the risks of lip deformity, changes in the oral aperture, infection, scarring, bleeding, prolonged wound healing, incomplete removal, allergy to anesthesia, nerve injury and recurrence were addressed. Prior to the procedure, the treatment site was clearly identified and confirmed by the patient. All components of Universal Protocol/PAUSE Rule completed. Consent (Scalp)/Introductory Paragraph: The rationale for Mohs was explained to the patient and consent was obtained. The risks, benefits and alternatives to therapy were discussed in detail. Specifically, the risks of changes in hair growth pattern secondary to repair, infection, scarring, bleeding, prolonged wound healing, incomplete removal, allergy to anesthesia, nerve injury and recurrence were addressed. Prior to the procedure, the treatment site was clearly identified and confirmed by the patient. All components of Universal Protocol/PAUSE Rule completed. Detail Level: Detailed Postop Diagnosis: same Anesthesia Type: 1% lidocaine with 1:300,000 epinephrine and a 1:10 solution of 8.4% sodium bicarbonate Anesthesia Volume In Cc: 2 Hemostasis: Electrocautery Estimated Blood Loss (Cc): minimal Repair Anesthesia Method: local infiltration Anesthesia Type: 1% lidocaine with 1:400,000 epinephrine and a 1:10 solution of 8.4% sodium bicarbonate Anesthesia Volume In Cc: 3 Brow Lift Text: A midfrontal incision was made medially to the defect to allow access to the tissues just superior to the left eyebrow. Following careful dissection inferiorly in a supraperiosteal plane to the level of the left eyebrow, several 3-0 monocryl sutures were used to resuspend the eyebrow orbicularis oculi muscular unit to the superior frontal bone periosteum. This resulted in an appropriate reapproximation of static eyebrow symmetry and correction of the left brow ptosis. Deep Sutures: 5-0 Monocryl Epidermal Closure: simple interrupted Suturegard Intro: Intraoperative tissue expansion was performed, utilizing the SUTUREGARD device, in order to reduce wound tension. Suturegard Body: The suture ends were repeatedly re-tightened and re-clamped to achieve the desired tissue expansion. Hemigard Intro: Due to skin fragility and wound tension, it was decided to use HEMIGARD adhesive retention suture devices to permit a linear closure. The skin was cleaned and dried for a 6cm distance away from the wound. Excessive hair, if present, was removed to allow for adhesion. Hemigard Postcare Instructions: The HEMIGARD strips are to remain completely dry for at least 5-7 days. Donor Site Anesthesia Type: same as repair anesthesia Epidermal Closure Graft Donor Site (Optional): running Graft Donor Site Bandage (Optional-Leave Blank If You Don't Want In Note): Pressure bandage were applied to the donor site. Closure 2 Information: This tab is for additional flaps and grafts, including complex repair and grafts and complex repair and flaps. You can also specify a different location for the additional defect, if the location is the same you do not need to select a new one. We will insert the automated text for the repair you select below just as we do for solitary flaps and grafts. Please note that at this time if you select a location with a different insurance zone you will need to override the ICD10 and CPT if appropriate. Closure 3 Information: This tab is for additional flaps and grafts above and beyond our usual structured repairs.  Please note if you enter information here it will not currently bill and you will need to add the billing information manually. Closure 4 Information: This tab is for additional flaps and grafts above and beyond our usual structured repairs.  Please note if you enter information here it will not currently bill and you will need to add the billing information manually. Wound Care: Vaseline Dressing: pressure dressing Wound Care (No Sutures): Petrolatum Suture Removal: 7 days Home Suture Removal Text: Patient was provided instructions on removing sutures and will remove their sutures at home.  If they have any questions or difficulties they will call the office. Post-Care Instructions: I reviewed with the patient in detail post-care instructions. Patient is not to engage in any heavy lifting, exercise, or swimming for the next 14 days. Should the patient develop any fevers, chills, bleeding, severe pain patient will contact the office immediately. Pain Refusal Text: I offered to prescribe pain medication but the patient refused to take this medication. Mauc Instructions: By selecting yes to the question below the MAUC number will be added into the note.  This will be calculated automatically based on the diagnosis chosen, the size entered, the body zone selected (H,M,L) and the specific indications you chose. You will also have the option to override the Mohs AUC if you disagree with the automatically calculated number and this option is found in the Case Summary tab. Where Do You Want The Question To Include Opioid Counseling Located?: Case Summary Tab Eye Protection Verbiage: Before proceeding with the stage, a plastic scleral shield was inserted. The globe was anesthetized with a few drops of 1% lidocaine with 1:100,000 epinephrine. Then, an appropriate sized scleral shield was chosen and coated with lacrilube ointment. The shield was gently inserted and left in place for the duration of each stage. After the stage was completed, the shield was gently removed. Mohs Method Verbiage: An incision at a 45 degree angle following the standard Mohs approach was done and the specimen was harvested as a microscopic controlled layer. Surgeon/Pathologist Verbiage (Will Incorporate Name Of Surgeon From Intro If Not Blank): operated in two distinct and integrated capacities as the surgeon and pathologist. Mohs Histo Method Verbiage: Each section was then chromacoded and processed in the Mohs lab using the Mohs protocol and submitted for frozen section. Subsequent Stages Histo Method Verbiage: Using a similar technique to that described above, a thin layer of tissue was removed from all areas where tumor was visible on the previous stage.  The tissue was again oriented, mapped, dyed, and processed as above. Mohs Rapid Report Verbiage: The area of clinically evident tumor was marked with skin marking ink and appropriately hatched.  The initial incision was made following the Mohs approach through the skin.  The specimen was taken to the lab, divided into the necessary number of pieces, chromacoded and processed according to the Mohs protocol.  This was repeated in successive stages until a tumor free defect was achieved. Complex Repair Preamble Text (Leave Blank If You Do Not Want): Extensive wide undermining was performed. Intermediate Repair Preamble Text (Leave Blank If You Do Not Want): Undermining was performed with blunt dissection. Non-Graft Cartilage Fenestration Text: The cartilage was fenestrated with a 2mm punch biopsy to help facilitate healing. Graft Cartilage Fenestration Text: The cartilage was fenestrated with a 2mm punch biopsy to help facilitate graft survival and healing. Secondary Intention Text (Leave Blank If You Do Not Want): The defect will heal with secondary intention. No Repair - Repaired With Adjacent Surgical Defect Text (Leave Blank If You Do Not Want): After obtaining clear surgical margins the defect was repaired concurrently with another surgical defect which was in close approximation. Adjacent Tissue Transfer Text: The defect edges were debeveled with a #15 scalpel blade.  Given the location of the defect and the proximity to free margins an adjacent tissue transfer was deemed most appropriate.  Using a sterile surgical marker, an appropriate flap was drawn incorporating the defect and placing the expected incisions within the relaxed skin tension lines where possible.    The area thus outlined was incised deep to adipose tissue with a #15 scalpel blade.  The skin margins were undermined to an appropriate distance in all directions utilizing iris scissors. Advancement Flap (Single) Text: The defect edges were debeveled with a #15 scalpel blade.  Given the location of the defect and the proximity to free margins a single advancement flap was deemed most appropriate.  Using a sterile surgical marker, an appropriate advancement flap was drawn incorporating the defect and placing the expected incisions within the relaxed skin tension lines where possible.    The area thus outlined was incised deep to adipose tissue with a #15 scalpel blade.  The skin margins were undermined to an appropriate distance in all directions utilizing iris scissors. Advancement Flap (Double) Text: The defect edges were debeveled with a #15 scalpel blade.  Given the location of the defect and the proximity to free margins a double advancement flap was deemed most appropriate.  Using a sterile surgical marker, the appropriate advancement flaps were drawn incorporating the defect and placing the expected incisions within the relaxed skin tension lines where possible.    The area thus outlined was incised deep to adipose tissue with a #15 scalpel blade.  The skin margins were undermined to an appropriate distance in all directions utilizing iris scissors. Burow's Advancement Flap Text: The defect edges were debeveled with a #15 scalpel blade.  Given the location of the defect and the proximity to free margins a Burow's advancement flap was deemed most appropriate.  Using a sterile surgical marker, the appropriate advancement flap was drawn incorporating the defect and placing the expected incisions within the relaxed skin tension lines where possible.    The area thus outlined was incised deep to adipose tissue with a #15 scalpel blade.  The skin margins were undermined to an appropriate distance in all directions utilizing iris scissors. Chonodrocutaneous Helical Advancement Flap Text: The defect edges were debeveled with a #15 scalpel blade.  Given the location of the defect and the proximity to free margins a chondrocutaneous helical advancement flap was deemed most appropriate.  Using a sterile surgical marker, the appropriate advancement flap was drawn incorporating the defect and placing the expected incisions within the relaxed skin tension lines where possible.    The area thus outlined was incised deep to adipose tissue with a #15 scalpel blade.  The skin margins were undermined to an appropriate distance in all directions utilizing iris scissors. Crescentic Advancement Flap Text: The defect edges were debeveled with a #15 scalpel blade.  Given the location of the defect and the proximity to free margins a crescentic advancement flap was deemed most appropriate.  Using a sterile surgical marker, the appropriate advancement flap was drawn incorporating the defect and placing the expected incisions within the relaxed skin tension lines where possible.    The area thus outlined was incised deep to adipose tissue with a #15 scalpel blade.  The skin margins were undermined to an appropriate distance in all directions utilizing iris scissors. A-T Advancement Flap Text: The defect edges were debeveled with a #15 scalpel blade.  Given the location of the defect, shape of the defect and the proximity to free margins an A-T advancement flap was deemed most appropriate.  Using a sterile surgical marker, an appropriate advancement flap was drawn incorporating the defect and placing the expected incisions within the relaxed skin tension lines where possible.    The area thus outlined was incised deep to adipose tissue with a #15 scalpel blade.  The skin margins were undermined to an appropriate distance in all directions utilizing iris scissors. O-T Advancement Flap Text: The defect edges were debeveled with a #15 scalpel blade.  Given the location of the defect, shape of the defect and the proximity to free margins an O-T advancement flap was deemed most appropriate.  Using a sterile surgical marker, an appropriate advancement flap was drawn incorporating the defect and placing the expected incisions within the relaxed skin tension lines where possible.    The area thus outlined was incised deep to adipose tissue with a #15 scalpel blade.  The skin margins were undermined to an appropriate distance in all directions utilizing iris scissors. O-L Flap Text: The defect edges were debeveled with a #15 scalpel blade.  Given the location of the defect, shape of the defect and the proximity to free margins an O-L flap was deemed most appropriate.  Using a sterile surgical marker, an appropriate advancement flap was drawn incorporating the defect and placing the expected incisions within the relaxed skin tension lines where possible.    The area thus outlined was incised deep to adipose tissue with a #15 scalpel blade.  The skin margins were undermined to an appropriate distance in all directions utilizing iris scissors. O-Z Flap Text: The defect edges were debeveled with a #15 scalpel blade.  Given the location of the defect, shape of the defect and the proximity to free margins an O-Z flap was deemed most appropriate.  Using a sterile surgical marker, an appropriate transposition flap was drawn incorporating the defect and placing the expected incisions within the relaxed skin tension lines where possible. The area thus outlined was incised deep to adipose tissue with a #15 scalpel blade.  The skin margins were undermined to an appropriate distance in all directions utilizing iris scissors. Double O-Z Flap Text: The defect edges were debeveled with a #15 scalpel blade.  Given the location of the defect, shape of the defect and the proximity to free margins a Double O-Z flap was deemed most appropriate.  Using a sterile surgical marker, an appropriate transposition flap was drawn incorporating the defect and placing the expected incisions within the relaxed skin tension lines where possible. The area thus outlined was incised deep to adipose tissue with a #15 scalpel blade.  The skin margins were undermined to an appropriate distance in all directions utilizing iris scissors. V-Y Flap Text: The defect edges were debeveled with a #15 scalpel blade.  Given the location of the defect, shape of the defect and the proximity to free margins a V-Y flap was deemed most appropriate.  Using a sterile surgical marker, an appropriate advancement flap was drawn incorporating the defect and placing the expected incisions within the relaxed skin tension lines where possible.    The area thus outlined was incised deep to adipose tissue with a #15 scalpel blade.  The skin margins were undermined to an appropriate distance in all directions utilizing iris scissors. Advancement-Rotation Flap Text: The defect edges were debeveled with a #15 scalpel blade.  Given the location of the defect, shape of the defect and the proximity to free margins an advancement-rotation flap was deemed most appropriate.  Using a sterile surgical marker, an appropriate flap was drawn incorporating the defect and placing the expected incisions within the relaxed skin tension lines where possible. The area thus outlined was incised deep to adipose tissue with a #15 scalpel blade.  The skin margins were undermined to an appropriate distance in all directions utilizing iris scissors. Mercedes Flap Text: The defect edges were debeveled with a #15 scalpel blade.  Given the location of the defect, shape of the defect and the proximity to free margins a Mercedes flap was deemed most appropriate.  Using a sterile surgical marker, an appropriate advancement flap was drawn incorporating the defect and placing the expected incisions within the relaxed skin tension lines where possible. The area thus outlined was incised deep to adipose tissue with a #15 scalpel blade.  The skin margins were undermined to an appropriate distance in all directions utilizing iris scissors. Modified Advancement Flap Text: The defect edges were debeveled with a #15 scalpel blade.  Given the location of the defect, shape of the defect and the proximity to free margins a modified advancement flap was deemed most appropriate.  Using a sterile surgical marker, an appropriate advancement flap was drawn incorporating the defect and placing the expected incisions within the relaxed skin tension lines where possible.    The area thus outlined was incised deep to adipose tissue with a #15 scalpel blade.  The skin margins were undermined to an appropriate distance in all directions utilizing iris scissors. Mucosal Advancement Flap Text: Given the location of the defect, shape of the defect and the proximity to free margins a mucosal advancement flap was deemed most appropriate. Incisions were made with a 15 blade scalpel in the appropriate fashion along the cutaneous vermillion border and the mucosal lip. The remaining actinically damaged mucosal tissue was excised.  The mucosal advancement flap was then elevated to the gingival sulcus with care taken to preserve the neurovascular structures and advanced into the primary defect. Care was taken to ensure that precise realignment of the vermillion border was achieved. Peng Advancement Flap Text: The defect edges were debeveled with a #15 scalpel blade.  Given the location of the defect, shape of the defect and the proximity to free margins a Peng advancement flap was deemed most appropriate.  Using a sterile surgical marker, an appropriate advancement flap was drawn incorporating the defect and placing the expected incisions within the relaxed skin tension lines where possible. The area thus outlined was incised deep to adipose tissue with a #15 scalpel blade.  The skin margins were undermined to an appropriate distance in all directions utilizing iris scissors. Hatchet Flap Text: The defect edges were debeveled with a #15 scalpel blade.  Given the location of the defect, shape of the defect and the proximity to free margins a hatchet flap was deemed most appropriate.  Using a sterile surgical marker, an appropriate hatchet flap was drawn incorporating the defect and placing the expected incisions within the relaxed skin tension lines where possible.    The area thus outlined was incised deep to adipose tissue with a #15 scalpel blade.  The skin margins were undermined to an appropriate distance in all directions utilizing iris scissors. Rotation Flap Text: The defect edges were debeveled with a #15 scalpel blade.  Given the location of the defect, shape of the defect and the proximity to free margins a rotation flap was deemed most appropriate.  Using a sterile surgical marker, an appropriate rotation flap was drawn incorporating the defect and placing the expected incisions within the relaxed skin tension lines where possible.    The area thus outlined was incised deep to adipose tissue with a #15 scalpel blade.  The skin margins were undermined to an appropriate distance in all directions utilizing iris scissors. Spiral Flap Text: The defect edges were debeveled with a #15 scalpel blade.  Given the location of the defect, shape of the defect and the proximity to free margins a spiral flap was deemed most appropriate.  Using a sterile surgical marker, an appropriate rotation flap was drawn incorporating the defect and placing the expected incisions within the relaxed skin tension lines where possible. The area thus outlined was incised deep to adipose tissue with a #15 scalpel blade.  The skin margins were undermined to an appropriate distance in all directions utilizing iris scissors. Staged Advancement Flap Text: The defect edges were debeveled with a #15 scalpel blade.  Given the location of the defect, shape of the defect and the proximity to free margins a staged advancement flap was deemed most appropriate.  Using a sterile surgical marker, an appropriate advancement flap was drawn incorporating the defect and placing the expected incisions within the relaxed skin tension lines where possible. The area thus outlined was incised deep to adipose tissue with a #15 scalpel blade.  The skin margins were undermined to an appropriate distance in all directions utilizing iris scissors. Star Wedge Flap Text: The defect edges were debeveled with a #15 scalpel blade.  Given the location of the defect, shape of the defect and the proximity to free margins a star wedge flap was deemed most appropriate.  Using a sterile surgical marker, an appropriate rotation flap was drawn incorporating the defect and placing the expected incisions within the relaxed skin tension lines where possible. The area thus outlined was incised deep to adipose tissue with a #15 scalpel blade.  The skin margins were undermined to an appropriate distance in all directions utilizing iris scissors. Transposition Flap Text: The defect edges were debeveled with a #15 scalpel blade.  Given the location of the defect and the proximity to free margins a transposition flap was deemed most appropriate.  Using a sterile surgical marker, an appropriate transposition flap was drawn incorporating the defect.    The area thus outlined was incised deep to adipose tissue with a #15 scalpel blade.  The skin margins were undermined to an appropriate distance in all directions utilizing iris scissors. Muscle Hinge Flap Text: The defect edges were debeveled with a #15 scalpel blade.  Given the size, depth and location of the defect and the proximity to free margins a muscle hinge flap was deemed most appropriate.  Using a sterile surgical marker, an appropriate hinge flap was drawn incorporating the defect. The area thus outlined was incised with a #15 scalpel blade.  The skin margins were undermined to an appropriate distance in all directions utilizing iris scissors. Mustarde Flap Text: The defect edges were debeveled with a #15 scalpel blade.  Given the size, depth and location of the defect and the proximity to free margins a Mustarde flap was deemed most appropriate.  Using a sterile surgical marker, an appropriate flap was drawn incorporating the defect. The area thus outlined was incised with a #15 scalpel blade.  The skin margins were undermined to an appropriate distance in all directions utilizing iris scissors. Nasal Turnover Hinge Flap Text: The defect edges were debeveled with a #15 scalpel blade.  Given the size, depth, location of the defect and the defect being full thickness a nasal turnover hinge flap was deemed most appropriate.  Using a sterile surgical marker, an appropriate hinge flap was drawn incorporating the defect. The area thus outlined was incised with a #15 scalpel blade. The flap was designed to recreate the nasal mucosal lining and the alar rim. The skin margins were undermined to an appropriate distance in all directions utilizing iris scissors. Nasalis-Muscle-Based Myocutaneous Island Pedicle Flap Text: Using a #15 blade, an incision was made around the donor flap to the level of the nasalis muscle. Wide lateral undermining was then performed in both the subcutaneous plane above the nasalis muscle, and in a submuscular plane just above periosteum. This allowed the formation of a free nasalis muscle axial pedicle (based on the angular artery) which was still attached to the actual cutaneous flap, increasing its mobility and vascular viability. Hemostasis was obtained with pinpoint electrocoagulation. The flap was mobilized into position and the pivotal anchor points positioned and stabilized with buried interrupted sutures. Subcutaneous and dermal tissues were closed in a multilayered fashion with sutures. Tissue redundancies were excised, and the epidermal edges were apposed without significant tension and sutured with sutures. Orbicularis Oris Muscle Flap Text: The defect edges were debeveled with a #15 scalpel blade.  Given that the defect affected the competency of the oral sphincter an obicularis oris muscle flap was deemed most appropriate to restore this competency and normal muscle function.  Using a sterile surgical marker, an appropriate flap was drawn incorporating the defect. The area thus outlined was incised with a #15 scalpel blade. Melolabial Transposition Flap Text: The defect edges were debeveled with a #15 scalpel blade.  Given the location of the defect and the proximity to free margins a melolabial flap was deemed most appropriate.  Using a sterile surgical marker, an appropriate melolabial transposition flap was drawn incorporating the defect.    The area thus outlined was incised deep to adipose tissue with a #15 scalpel blade.  The skin margins were undermined to an appropriate distance in all directions utilizing iris scissors. Rhombic Flap Text: The defect edges were debeveled with a #15 scalpel blade.  Given the location of the defect and the proximity to free margins a rhombic flap was deemed most appropriate.  Using a sterile surgical marker, an appropriate rhombic flap was drawn incorporating the defect.    The area thus outlined was incised deep to adipose tissue with a #15 scalpel blade.  The skin margins were undermined to an appropriate distance in all directions utilizing iris scissors. Rhomboid Transposition Flap Text: The defect edges were debeveled with a #15 scalpel blade.  Given the location of the defect and the proximity to free margins a rhomboid transposition flap was deemed most appropriate.  Using a sterile surgical marker, an appropriate rhomboid flap was drawn incorporating the defect.    The area thus outlined was incised deep to adipose tissue with a #15 scalpel blade.  The skin margins were undermined to an appropriate distance in all directions utilizing iris scissors. Bi-Rhombic Flap Text: The defect edges were debeveled with a #15 scalpel blade.  Given the location of the defect and the proximity to free margins a bi-rhombic flap was deemed most appropriate.  Using a sterile surgical marker, an appropriate rhombic flap was drawn incorporating the defect. The area thus outlined was incised deep to adipose tissue with a #15 scalpel blade.  The skin margins were undermined to an appropriate distance in all directions utilizing iris scissors. Helical Rim Advancement Flap Text: The defect edges were debeveled with a #15 blade scalpel.  Given the location of the defect and the proximity to free margins (helical rim) a double helical rim advancement flap was deemed most appropriate.  Using a sterile surgical marker, the appropriate advancement flaps were drawn incorporating the defect and placing the expected incisions between the helical rim and antihelix where possible.  The area thus outlined was incised through and through with a #15 scalpel blade.  With a skin hook and iris scissors, the flaps were gently and sharply undermined and freed up. Bilateral Helical Rim Advancement Flap Text: The defect edges were debeveled with a #15 blade scalpel.  Given the location of the defect and the proximity to free margins (helical rim) a bilateral helical rim advancement flap was deemed most appropriate.  Using a sterile surgical marker, the appropriate advancement flaps were drawn incorporating the defect and placing the expected incisions between the helical rim and antihelix where possible.  The area thus outlined was incised through and through with a #15 scalpel blade.  With a skin hook and iris scissors, the flaps were gently and sharply undermined and freed up. Ear Star Wedge Flap Text: The defect edges were debeveled with a #15 blade scalpel.  Given the location of the defect and the proximity to free margins (helical rim) an ear star wedge flap was deemed most appropriate.  Using a sterile surgical marker, the appropriate flap was drawn incorporating the defect and placing the expected incisions between the helical rim and antihelix where possible.  The area thus outlined was incised through and through with a #15 scalpel blade. Banner Transposition Flap Text: The defect edges were debeveled with a #15 scalpel blade.  Given the location of the defect and the proximity to free margins a Banner transposition flap was deemed most appropriate.  Using a sterile surgical marker, an appropriate flap drawn around the defect. The area thus outlined was incised deep to adipose tissue with a #15 scalpel blade.  The skin margins were undermined to an appropriate distance in all directions utilizing iris scissors. Bilobed Flap Text: The defect edges were debeveled with a #15 scalpel blade.  Given the location of the defect and the proximity to free margins a bilobe flap was deemed most appropriate.  Using a sterile surgical marker, an appropriate bilobe flap drawn around the defect.    The area thus outlined was incised deep to adipose tissue with a #15 scalpel blade.  The skin margins were undermined to an appropriate distance in all directions utilizing iris scissors. Bilobed Transposition Flap Text: The defect edges were debeveled with a #15 scalpel blade.  Given the location of the defect and the proximity to free margins a bilobed transposition flap was deemed most appropriate.  Using a sterile surgical marker, an appropriate bilobe flap drawn around the defect.    The area thus outlined was incised deep to adipose tissue with a #15 scalpel blade.  The skin margins were undermined to an appropriate distance in all directions utilizing iris scissors. Trilobed Flap Text: The defect edges were debeveled with a #15 scalpel blade.  Given the location of the defect and the proximity to free margins a trilobed flap was deemed most appropriate.  Using a sterile surgical marker, an appropriate trilobed flap drawn around the defect.    The area thus outlined was incised deep to adipose tissue with a #15 scalpel blade.  The skin margins were undermined to an appropriate distance in all directions utilizing iris scissors. Dorsal Nasal Flap Text: The defect edges were debeveled with a #15 scalpel blade.  Given the location of the defect and the proximity to free margins a dorsal nasal flap was deemed most appropriate.  Using a sterile surgical marker, an appropriate dorsal nasal flap was drawn around the defect.    The area thus outlined was incised deep to adipose tissue with a #15 scalpel blade.  The skin margins were undermined to an appropriate distance in all directions utilizing iris scissors. Island Pedicle Flap Text: The defect edges were debeveled with a #15 scalpel blade.  Given the location of the defect, shape of the defect and the proximity to free margins an island pedicle advancement flap was deemed most appropriate.  Using a sterile surgical marker, an appropriate advancement flap was drawn incorporating the defect, outlining the appropriate donor tissue and placing the expected incisions within the relaxed skin tension lines where possible.    The area thus outlined was incised deep to adipose tissue with a #15 scalpel blade.  The skin margins were undermined to an appropriate distance in all directions around the primary defect and laterally outward around the island pedicle utilizing iris scissors.  There was minimal undermining beneath the pedicle flap. Island Pedicle Flap With Canthal Suspension Text: The defect edges were debeveled with a #15 scalpel blade.  Given the location of the defect, shape of the defect and the proximity to free margins an island pedicle advancement flap was deemed most appropriate.  Using a sterile surgical marker, an appropriate advancement flap was drawn incorporating the defect, outlining the appropriate donor tissue and placing the expected incisions within the relaxed skin tension lines where possible. The area thus outlined was incised deep to adipose tissue with a #15 scalpel blade.  The skin margins were undermined to an appropriate distance in all directions around the primary defect and laterally outward around the island pedicle utilizing iris scissors.  There was minimal undermining beneath the pedicle flap. A suspension suture was placed in the canthal tendon to prevent tension and prevent ectropion. Alar Island Pedicle Flap Text: The defect edges were debeveled with a #15 scalpel blade.  Given the location of the defect, shape of the defect and the proximity to the alar rim an island pedicle advancement flap was deemed most appropriate.  Using a sterile surgical marker, an appropriate advancement flap was drawn incorporating the defect, outlining the appropriate donor tissue and placing the expected incisions within the nasal ala running parallel to the alar rim. The area thus outlined was incised with a #15 scalpel blade.  The skin margins were undermined minimally to an appropriate distance in all directions around the primary defect and laterally outward around the island pedicle utilizing iris scissors.  There was minimal undermining beneath the pedicle flap. Double Island Pedicle Flap Text: The defect edges were debeveled with a #15 scalpel blade.  Given the location of the defect, shape of the defect and the proximity to free margins a double island pedicle advancement flap was deemed most appropriate.  Using a sterile surgical marker, an appropriate advancement flap was drawn incorporating the defect, outlining the appropriate donor tissue and placing the expected incisions within the relaxed skin tension lines where possible.    The area thus outlined was incised deep to adipose tissue with a #15 scalpel blade.  The skin margins were undermined to an appropriate distance in all directions around the primary defect and laterally outward around the island pedicle utilizing iris scissors.  There was minimal undermining beneath the pedicle flap. Island Pedicle Flap-Requiring Vessel Identification Text: The defect edges were debeveled with a #15 scalpel blade.  Given the location of the defect, shape of the defect and the proximity to free margins an island pedicle advancement flap was deemed most appropriate.  Using a sterile surgical marker, an appropriate advancement flap was drawn, based on the axial vessel mentioned above, incorporating the defect, outlining the appropriate donor tissue and placing the expected incisions within the relaxed skin tension lines where possible.    The area thus outlined was incised deep to adipose tissue with a #15 scalpel blade.  The skin margins were undermined to an appropriate distance in all directions around the primary defect and laterally outward around the island pedicle utilizing iris scissors.  There was minimal undermining beneath the pedicle flap. Keystone Flap Text: The defect edges were debeveled with a #15 scalpel blade.  Given the location of the defect, shape of the defect a keystone flap was deemed most appropriate.  Using a sterile surgical marker, an appropriate keystone flap was drawn incorporating the defect, outlining the appropriate donor tissue and placing the expected incisions within the relaxed skin tension lines where possible. The area thus outlined was incised deep to adipose tissue with a #15 scalpel blade.  The skin margins were undermined to an appropriate distance in all directions around the primary defect and laterally outward around the flap utilizing iris scissors. O-T Plasty Text: The defect edges were debeveled with a #15 scalpel blade.  Given the location of the defect, shape of the defect and the proximity to free margins an O-T plasty was deemed most appropriate.  Using a sterile surgical marker, an appropriate O-T plasty was drawn incorporating the defect and placing the expected incisions within the relaxed skin tension lines where possible.    The area thus outlined was incised deep to adipose tissue with a #15 scalpel blade.  The skin margins were undermined to an appropriate distance in all directions utilizing iris scissors. O-Z Plasty Text: The defect edges were debeveled with a #15 scalpel blade.  Given the location of the defect, shape of the defect and the proximity to free margins an O-Z plasty (double transposition flap) was deemed most appropriate.  Using a sterile surgical marker, the appropriate transposition flaps were drawn incorporating the defect and placing the expected incisions within the relaxed skin tension lines where possible.    The area thus outlined was incised deep to adipose tissue with a #15 scalpel blade.  The skin margins were undermined to an appropriate distance in all directions utilizing iris scissors.  Hemostasis was achieved with electrocautery.  The flaps were then transposed into place, one clockwise and the other counterclockwise, and anchored with interrupted buried subcutaneous sutures. Double O-Z Plasty Text: The defect edges were debeveled with a #15 scalpel blade.  Given the location of the defect, shape of the defect and the proximity to free margins a Double O-Z plasty (double transposition flap) was deemed most appropriate.  Using a sterile surgical marker, the appropriate transposition flaps were drawn incorporating the defect and placing the expected incisions within the relaxed skin tension lines where possible. The area thus outlined was incised deep to adipose tissue with a #15 scalpel blade.  The skin margins were undermined to an appropriate distance in all directions utilizing iris scissors.  Hemostasis was achieved with electrocautery.  The flaps were then transposed into place, one clockwise and the other counterclockwise, and anchored with interrupted buried subcutaneous sutures. V-Y Plasty Text: The defect edges were debeveled with a #15 scalpel blade.  Given the location of the defect, shape of the defect and the proximity to free margins an V-Y advancement flap was deemed most appropriate.  Using a sterile surgical marker, an appropriate advancement flap was drawn incorporating the defect and placing the expected incisions within the relaxed skin tension lines where possible.    The area thus outlined was incised deep to adipose tissue with a #15 scalpel blade.  The skin margins were undermined to an appropriate distance in all directions utilizing iris scissors. H Plasty Text: Given the location of the defect, shape of the defect and the proximity to free margins a H-plasty was deemed most appropriate for repair.  Using a sterile surgical marker, the appropriate advancement arms of the H-plasty were drawn incorporating the defect and placing the expected incisions within the relaxed skin tension lines where possible. The area thus outlined was incised deep to adipose tissue with a #15 scalpel blade. The skin margins were undermined to an appropriate distance in all directions utilizing iris scissors.  The opposing advancement arms were then advanced into place in opposite direction and anchored with interrupted buried subcutaneous sutures. W Plasty Text: The lesion was extirpated to the level of the fat with a #15 scalpel blade.  Given the location of the defect, shape of the defect and the proximity to free margins a W-plasty was deemed most appropriate for repair.  Using a sterile surgical marker, the appropriate transposition arms of the W-plasty were drawn incorporating the defect and placing the expected incisions within the relaxed skin tension lines where possible.    The area thus outlined was incised deep to adipose tissue with a #15 scalpel blade.  The skin margins were undermined to an appropriate distance in all directions utilizing iris scissors.  The opposing transposition arms were then transposed into place in opposite direction and anchored with interrupted buried subcutaneous sutures. Z Plasty Text: The lesion was extirpated to the level of the fat with a #15 scalpel blade.  Given the location of the defect, shape of the defect and the proximity to free margins a Z-plasty was deemed most appropriate for repair.  Using a sterile surgical marker, the appropriate transposition arms of the Z-plasty were drawn incorporating the defect and placing the expected incisions within the relaxed skin tension lines where possible.    The area thus outlined was incised deep to adipose tissue with a #15 scalpel blade.  The skin margins were undermined to an appropriate distance in all directions utilizing iris scissors.  The opposing transposition arms were then transposed into place in opposite direction and anchored with interrupted buried subcutaneous sutures. Zygomaticofacial Flap Text: Given the location of the defect, shape of the defect and the proximity to free margins a zygomaticofacial flap was deemed most appropriate for repair.  Using a sterile surgical marker, the appropriate flap was drawn incorporating the defect and placing the expected incisions within the relaxed skin tension lines where possible. The area thus outlined was incised deep to adipose tissue with a #15 scalpel blade with preservation of a vascular pedicle.  The skin margins were undermined to an appropriate distance in all directions utilizing iris scissors.  The flap was then placed into the defect and anchored with interrupted buried subcutaneous sutures. Cheek Interpolation Flap Text: A decision was made to reconstruct the defect utilizing an interpolation axial flap and a staged reconstruction.  A telfa template was made of the defect.  This telfa template was then used to outline the Cheek Interpolation flap.  The donor area for the pedicle flap was then injected with anesthesia.  The flap was excised through the skin and subcutaneous tissue down to the layer of the underlying musculature.  The interpolation flap was carefully excised within this deep plane to maintain its blood supply.  The edges of the donor site were undermined.   The donor site was closed in a primary fashion.  The pedicle was then rotated into position and sutured.  Once the tube was sutured into place, adequate blood supply was confirmed with blanching and refill.  The pedicle was then wrapped with xeroform gauze and dressed appropriately with a telfa and gauze bandage to ensure continued blood supply and protect the attached pedicle. Cheek-To-Nose Interpolation Flap Text: A decision was made to reconstruct the defect utilizing an interpolation axial flap and a staged reconstruction.  A telfa template was made of the defect.  This telfa template was then used to outline the Cheek-To-Nose Interpolation flap.  The donor area for the pedicle flap was then injected with anesthesia.  The flap was excised through the skin and subcutaneous tissue down to the layer of the underlying musculature.  The interpolation flap was carefully excised within this deep plane to maintain its blood supply.  The edges of the donor site were undermined.   The donor site was closed in a primary fashion.  The pedicle was then rotated into position and sutured.  Once the tube was sutured into place, adequate blood supply was confirmed with blanching and refill.  The pedicle was then wrapped with xeroform gauze and dressed appropriately with a telfa and gauze bandage to ensure continued blood supply and protect the attached pedicle. Interpolation Flap Text: A decision was made to reconstruct the defect utilizing an interpolation axial flap and a staged reconstruction.  A telfa template was made of the defect.  This telfa template was then used to outline the interpolation flap.  The donor area for the pedicle flap was then injected with anesthesia.  The flap was excised through the skin and subcutaneous tissue down to the layer of the underlying musculature.  The interpolation flap was carefully excised within this deep plane to maintain its blood supply.  The edges of the donor site were undermined.   The donor site was closed in a primary fashion.  The pedicle was then rotated into position and sutured.  Once the tube was sutured into place, adequate blood supply was confirmed with blanching and refill.  The pedicle was then wrapped with xeroform gauze and dressed appropriately with a telfa and gauze bandage to ensure continued blood supply and protect the attached pedicle. Melolabial Interpolation Flap Text: A decision was made to reconstruct the defect utilizing an interpolation axial flap and a staged reconstruction.  A telfa template was made of the defect.  This telfa template was then used to outline the melolabial interpolation flap.  The donor area for the pedicle flap was then injected with anesthesia.  The flap was excised through the skin and subcutaneous tissue down to the layer of the underlying musculature.  The pedicle flap was carefully excised within this deep plane to maintain its blood supply.  The edges of the donor site were undermined.   The donor site was closed in a primary fashion.  The pedicle was then rotated into position and sutured.  Once the tube was sutured into place, adequate blood supply was confirmed with blanching and refill.  The pedicle was then wrapped with xeroform gauze and dressed appropriately with a telfa and gauze bandage to ensure continued blood supply and protect the attached pedicle. Mastoid Interpolation Flap Text: A decision was made to reconstruct the defect utilizing an interpolation axial flap and a staged reconstruction.  A telfa template was made of the defect.  This telfa template was then used to outline the mastoid interpolation flap.  The donor area for the pedicle flap was then injected with anesthesia.  The flap was excised through the skin and subcutaneous tissue down to the layer of the underlying musculature.  The pedicle flap was carefully excised within this deep plane to maintain its blood supply.  The edges of the donor site were undermined.   The donor site was closed in a primary fashion.  The pedicle was then rotated into position and sutured.  Once the tube was sutured into place, adequate blood supply was confirmed with blanching and refill.  The pedicle was then wrapped with xeroform gauze and dressed appropriately with a telfa and gauze bandage to ensure continued blood supply and protect the attached pedicle. Posterior Auricular Interpolation Flap Text: A decision was made to reconstruct the defect utilizing an interpolation axial flap and a staged reconstruction.  A telfa template was made of the defect.  This telfa template was then used to outline the posterior auricular interpolation flap.  The donor area for the pedicle flap was then injected with anesthesia.  The flap was excised through the skin and subcutaneous tissue down to the layer of the underlying musculature.  The pedicle flap was carefully excised within this deep plane to maintain its blood supply.  The edges of the donor site were undermined.   The donor site was closed in a primary fashion.  The pedicle was then rotated into position and sutured.  Once the tube was sutured into place, adequate blood supply was confirmed with blanching and refill.  The pedicle was then wrapped with xeroform gauze and dressed appropriately with a telfa and gauze bandage to ensure continued blood supply and protect the attached pedicle. Paramedian Forehead Flap Text: A decision was made to reconstruct the defect utilizing an interpolation axial flap and a staged reconstruction.  A telfa template was made of the defect.  This telfa template was then used to outline the paramedian forehead pedicle flap.  The donor area for the pedicle flap was then injected with anesthesia.  The flap was excised through the skin and subcutaneous tissue down to the layer of the underlying musculature.  The pedicle flap was carefully excised within this deep plane to maintain its blood supply.  The edges of the donor site were undermined.   The donor site was closed in a primary fashion.  The pedicle was then rotated into position and sutured.  Once the tube was sutured into place, adequate blood supply was confirmed with blanching and refill.  The pedicle was then wrapped with xeroform gauze and dressed appropriately with a telfa and gauze bandage to ensure continued blood supply and protect the attached pedicle. Cheiloplasty (Less Than 50%) Text: A decision was made to reconstruct the defect with a  cheiloplasty.  The defect was undermined extensively.  Additional obicularis oris muscle was excised with a 15 blade scalpel.  The defect was converted into a full thickness wedge, of less than 50% of the vertical height of the lip, to facilite a better cosmetic result.  Small vessels were then tied off with 5-0 monocyrl. The obicularis oris, superficial fascia, adipose and dermis were then reapproximated.  After the deeper layers were approximated the epidermis was reapproximated with particular care given to realign the vermillion border. Cheiloplasty (Complex) Text: A decision was made to reconstruct the defect with a  cheiloplasty.  The defect was undermined extensively.  Additional obicularis oris muscle was excised with a 15 blade scalpel.  The defect was converted into a full thickness wedge to facilite a better cosmetic result.  Small vessels were then tied off with 5-0 monocyrl. The obicularis oris, superficial fascia, adipose and dermis were then reapproximated.  After the deeper layers were approximated the epidermis was reapproximated with particular care given to realign the vermillion border. Ear Wedge Repair Text: A wedge excision was completed by carrying down an excision through the full thickness of the ear and cartilage with an inward facing Burow's triangle. The wound was then closed in a layered fashion. Full Thickness Lip Wedge Repair (Flap) Text: Given the location of the defect and the proximity to free margins a full thickness wedge repair was deemed most appropriate.  Using a sterile surgical marker, the appropriate repair was drawn incorporating the defect and placing the expected incisions perpendicular to the vermillion border.  The vermillion border was also meticulously outlined to ensure appropriate reapproximation during the repair.  The area thus outlined was incised through and through with a #15 scalpel blade.  The muscularis and dermis were reaproximated with deep sutures following hemostasis. Care was taken to realign the vermillion border before proceeding with the superficial closure.  Once the vermillion was realigned the superfical and mucosal closure was finished. Ftsg Text: The defect edges were debeveled with a #15 scalpel blade.  Given the location of the defect, shape of the defect and the proximity to free margins a full thickness skin graft was deemed most appropriate.  Using a sterile surgical marker, the primary defect shape was transferred to the donor site. The area thus outlined was incised deep to adipose tissue with a #15 scalpel blade.  The harvested graft was then trimmed of adipose tissue until only dermis and epidermis was left.  The skin margins of the secondary defect were undermined to an appropriate distance in all directions utilizing iris scissors.  The secondary defect was closed with interrupted buried subcutaneous sutures.  The skin edges were then re-apposed with running  sutures.  The skin graft was then placed in the primary defect and oriented appropriately. Split-Thickness Skin Graft Text: The defect edges were debeveled with a #15 scalpel blade.  Given the location of the defect, shape of the defect and the proximity to free margins a split thickness skin graft was deemed most appropriate.  Using a sterile surgical marker, the primary defect shape was transferred to the donor site. The split thickness graft was then harvested.  The skin graft was then placed in the primary defect and oriented appropriately. Burow's Graft Text: The defect edges were debeveled with a #15 scalpel blade.  Given the location of the defect, shape of the defect, the proximity to free margins and the presence of a standing cone deformity a Burow's skin graft was deemed most appropriate. The standing cone was removed and this tissue was then trimmed to the shape of the primary defect. The adipose tissue was also removed until only dermis and epidermis were left.  The skin margins of the secondary defect were undermined to an appropriate distance in all directions utilizing iris scissors.  The secondary defect was closed with interrupted buried subcutaneous sutures.  The skin edges were then re-apposed with running  sutures.  The skin graft was then placed in the primary defect and oriented appropriately. Cartilage Graft Text: The defect edges were debeveled with a #15 scalpel blade.  Given the location of the defect, shape of the defect, the fact the defect involved a parial thickness cartilage defect a cartilage graft was deemed most appropriate.  An appropriate donor site was identified, cleansed, and anesthetized. The cartilage graft was then harvested and transferred to the recipient site, oriented appropriately and placed in a subdermal plane that had been extensively undermined.  The secondary defect was then repaired using a primary closure with 6-O nylon. Composite Graft Text: The defect edges were debeveled with a #15 scalpel blade.  Given the location of the defect, shape of the defect, the proximity to free margins and the fact the defect was full thickness a composite graft was deemed most appropriate.  The defect was outline and then transferred to the donor site.  A full thickness graft was then excised from the donor site. The graft was then placed in the primary defect, oriented appropriately and then sutured into place.  The secondary defect was then repaired using a primary closure. Epidermal Autograft Text: The defect edges were debeveled with a #15 scalpel blade.  Given the location of the defect, shape of the defect and the proximity to free margins an epidermal autograft was deemed most appropriate.  Using a sterile surgical marker, the primary defect shape was transferred to the donor site. The epidermal graft was then harvested.  The skin graft was then placed in the primary defect and oriented appropriately. Dermal Autograft Text: The defect edges were debeveled with a #15 scalpel blade.  Given the location of the defect, shape of the defect and the proximity to free margins a dermal autograft was deemed most appropriate.  Using a sterile surgical marker, the primary defect shape was transferred to the donor site. The area thus outlined was incised deep to adipose tissue with a #15 scalpel blade.  The harvested graft was then trimmed of adipose and epidermal tissue until only dermis was left.  The skin graft was then placed in the primary defect and oriented appropriately. Skin Substitute Text: The defect edges were debeveled with a #15 scalpel blade.  Given the location of the defect, shape of the defect and the proximity to free margins a skin substitute graft was deemed most appropriate.  The graft material was trimmed to fit the size of the defect. The graft was then placed in the primary defect and oriented appropriately. Tissue Cultured Epidermal Autograft Text: The defect edges were debeveled with a #15 scalpel blade.  Given the location of the defect, shape of the defect and the proximity to free margins a tissue cultured epidermal autograft was deemed most appropriate.  The graft was then trimmed to fit the size of the defect.  The graft was then placed in the primary defect and oriented appropriately. Xenograft Text: The defect edges were debeveled with a #15 scalpel blade.  Given the location of the defect, shape of the defect and the proximity to free margins a xenograft was deemed most appropriate.  The graft was then trimmed to fit the size of the defect.  The graft was then placed in the primary defect and oriented appropriately. Purse String (Simple) Text: Given the location of the defect and the characteristics of the surrounding skin a pursestring closure was deemed most appropriate.  Undermining was performed circumfirentially around the surgical defect.  A purstring suture was then placed and tightened. Purse String (Intermediate) Text: Given the location of the defect and the characteristics of the surrounding skin a pursestring intermediate closure was deemed most appropriate.  Undermining was performed circumfirentially around the surgical defect.  A purstring suture was then placed and tightened. Partial Purse String (Simple) Text: Given the location of the defect and the characteristics of the surrounding skin a simple purse string closure was deemed most appropriate.  Undermining was performed circumfirentially around the surgical defect.  A purse string suture was then placed and tightened. Wound tension only allowed a partial closure of the circular defect. Partial Purse String (Intermediate) Text: Given the location of the defect and the characteristics of the surrounding skin an intermediate purse string closure was deemed most appropriate.  Undermining was performed circumfirentially around the surgical defect.  A purse string suture was then placed and tightened. Wound tension only allowed a partial closure of the circular defect. Localized Dermabrasion Text: The patient was draped in routine manner.  Localized dermabrasion using 3 x 17 mm wire brush was performed in routine manner to papillary dermis. This spot dermabrasion is being performed to complete skin cancer reconstruction. It also will eliminate the other sun damaged precancerous cells that are known to be part of the regional effect of a lifetime's worth of sun exposure. This localized dermabrasion is therapeutic and should not be considered cosmetic in any regard. Tarsorrhaphy Text: A tarsorrhaphy was performed using Frost sutures. Complex Repair And Flap Additional Text (Will Appearing After The Standard Complex Repair Text): The complex repair was not sufficient to completely close the primary defect. The remaining additional defect was repaired with the flap mentioned below. Complex Repair And Graft Additional Text (Will Appearing After The Standard Complex Repair Text): The complex repair was not sufficient to completely close the primary defect. The remaining additional defect was repaired with the graft mentioned below. Manual Repair Warning Statement: We plan on removing the manually selected variable below in favor of our much easier automatic structured text blocks found in the previous tab. We decided to do this to help make the flow better and give you the full power of structured data. Manual selection is never going to be ideal in our platform and I would encourage you to avoid using manual selection from this point on, especially since I will be sunsetting this feature. It is important that you do one of two things with the customized text below. First, you can save all of the text in a word file so you can have it for future reference. Second, transfer the text to the appropriate area in the Library tab. Lastly, if there is a flap or graft type which we do not have you need to let us know right away so I can add it in before the variable is hidden. No need to panic, we plan to give you roughly 6 months to make the change. Same Histology In Subsequent Stages Text: The pattern and morphology of the tumor is as described in the first stage. No Residual Tumor Seen Histology Text: There were no malignant cells seen in the sections examined. Inflammation Suggestive Of Cancer Camouflage Histology Text: There was a dense lymphocytic infiltrate which prevented adequate histologic evaluation of adjacent structures. Incidental Squamous Cell Carcinoma In Situ Histology Text: Focus of full-thickness keratinocyte atypia with loss of normal maturation consistent with squamous cell carcinoma in situ was incidentally found. Incidental Actinic Keratosis Histology Text: Partial-thickness atypia of epidermal keratinocytes with crowding and disorder consistent with actinic keratosis was incidentally found Bcc Histology Text: There were aggregates of atypical basaloid cells with peripheral palisading and clefting consistent with basal cell carcinoma. Bcc Infiltrative Histology Text: There were numerous aggregates of atypical basaloid cells demonstrating an infiltrative pattern. Bcc Infundibulocystic Histology Text: There were aggregates of basaloid cells with horn cysts and fibromyxoid stroma Bcc Micronodular Histology Text: There were small aggregates of atypical basaloid cells with peripheral palisading demonstrating a micronodular pattern. Bcc  Morpheaform/Sclerosing Histology Text: There were aggregates of atypical basaloid cells with sclerotic stroma Bcc  Nodular Histology Text: There were aggregates of atypical basaloid cells with peripheral palisading and clefting in a pattern consistent with nodular basal cell carcinoma. Bcc Pigmented Histology Text: There were aggregates of atypical basaloid cells with the presence of pigment consistent with pigmented basal cell carcinoma. Bcc Superficial Histology Text: There were aggregates of atypical basaloid cells located in the epidermis consistent with superficial basal cell carcinoma. Mixed Superficial And Nodular Bcc Histology Text: There were aggregates of atypical basaloid cells with peripheral palisading and clefting in a mixed superficial and nodular pattern. Mixed Nodular And Infiltrative Bcc Histology Text: There were aggregates of atypical basaloid cells with peripheral palisading and clefting in a mixed nodular and infiltrative pattern. Mixed Nodular And Micronodular Bcc Histology Text: There were aggregates of atypical basaloid cells with peripheral palisading and clefting in a mixed nodular and micronodular pattern. Fibroepithelioma Of Pinkus Histology Text: Aggregates of basaloid cells with pink strands and blue buds with fibromyxoid stroma in a pattern consistent with Fibroepithelioma of Pinkus. Scc Histology Text: Aggregates of atypical keratinocytes invading the dermis consistent with squamous cell carcinoma Scc Well Differentiated Histology Text: Well-differentiated aggregates of atypical keratinocytes invading the dermis consistent with squamous cell carcinoma Scc Moderately Differentiated Histology Text: Moderately differentiated aggregates of atypical keratinocytes invading the dermis consistent with squamous cell carcinoma Scc Poorly Differentiated Histology Text: Poorly differentiated aggregates of severely atypical keratinocytes invading the dermis consistent with poorly differentiated squamous cell carcinoma Scc Acantholytic Histology Text: Aggregates of atypical keratinocytes with acantholysis invading the dermis consistent with squamous cell carcinoma Scc Ka Subtype Histology Text: Aggregates of glassy atypical keratinocytes with evidence of keratin-filled crater consistent with keratoacanthoma type SCC Scc In Situ Histology Text: Full-thickness keratinocyte atypia with loss of normal maturation consistent with squamous cell carcinoma in situ Afx Histology Text: Atypical spindle cell proliferation with pleomorphic nuclei consistent with atypical fibroxanthoma Basosquamous Cell Carcinoma Histology Text: There were aggregates of atypical basaloid cells with squamous differentiation consistent with basosquamous cell carcinoma Dfsp Histology Text: Densely hypercellular fibrous tumor with growth in a storiform pattern that infiltrates the fat in a honeycomb pattern Sebaceous Carcinoma Histology Text: Atypical basaloid and foamy cells with scalloped nuclei consistent with sebaceous carcinoma Mart-1 - Positive Histology Text: MART-1 staining demonstrates areas of higher density and clustering of melanocytes with Pagetoid spread upwards within the epidermis. The surgical margins are positive for tumor cells. Mart-1 - Negative Histology Text: MART-1 staining demonstrates a normal density and pattern of melanocytes along the dermal-epidermal junction. The surgical margins are negative for tumor cells. Information: Selecting Yes will display possible errors in your note based on the variables you have selected. This validation is only offered as a suggestion for you. PLEASE NOTE THAT THE VALIDATION TEXT WILL BE REMOVED WHEN YOU FINALIZE YOUR NOTE. IF YOU WANT TO FAX A PRELIMINARY NOTE YOU WILL NEED TO TOGGLE THIS TO 'NO' IF YOU DO NOT WANT IT IN YOUR FAXED NOTE.

## 2023-10-16 ENCOUNTER — NON-APPOINTMENT (OUTPATIENT)
Age: 38
End: 2023-10-16

## 2023-10-16 ENCOUNTER — APPOINTMENT (OUTPATIENT)
Dept: NEUROSURGERY | Facility: CLINIC | Age: 38
End: 2023-10-16
Payer: OTHER MISCELLANEOUS

## 2023-10-16 VITALS
SYSTOLIC BLOOD PRESSURE: 112 MMHG | HEIGHT: 71 IN | OXYGEN SATURATION: 95 % | HEART RATE: 79 BPM | DIASTOLIC BLOOD PRESSURE: 68 MMHG

## 2023-10-16 PROCEDURE — 99214 OFFICE O/P EST MOD 30 MIN: CPT

## 2023-10-16 RX ORDER — TOPIRAMATE 25 MG/1
25 TABLET, FILM COATED ORAL
Qty: 60 | Refills: 1 | Status: DISCONTINUED | COMMUNITY
Start: 2021-06-11 | End: 2023-10-16

## 2023-10-16 RX ORDER — MECLIZINE HYDROCHLORIDE 12.5 MG/1
12.5 TABLET ORAL 3 TIMES DAILY
Qty: 90 | Refills: 0 | Status: DISCONTINUED | COMMUNITY
Start: 2019-09-06 | End: 2023-10-16

## 2023-10-16 RX ORDER — AMITRIPTYLINE HYDROCHLORIDE 25 MG/1
25 TABLET, FILM COATED ORAL
Qty: 30 | Refills: 3 | Status: ACTIVE | COMMUNITY
Start: 2023-10-16 | End: 1900-01-01

## 2023-10-16 RX ORDER — CALCIUM CARBONATE/VITAMIN D3 500-10/5ML
400 LIQUID (ML) ORAL
Qty: 60 | Refills: 0 | Status: DISCONTINUED | COMMUNITY
Start: 2019-08-05 | End: 2023-10-16

## 2023-10-16 RX ORDER — ACETAMINOPHEN 325 MG/1
325 TABLET, FILM COATED ORAL
Refills: 0 | Status: ACTIVE | COMMUNITY

## 2023-10-19 ENCOUNTER — APPOINTMENT (OUTPATIENT)
Dept: MRI IMAGING | Facility: IMAGING CENTER | Age: 38
End: 2023-10-19

## 2023-11-17 ENCOUNTER — APPOINTMENT (OUTPATIENT)
Dept: NEUROSURGERY | Facility: CLINIC | Age: 38
End: 2023-11-17

## 2024-03-27 PROBLEM — F07.81 CONCUSSION SYNDROME: Status: ACTIVE | Noted: 2019-07-25

## 2024-03-27 PROBLEM — S06.9X9D TRAUMATIC BRAIN INJURY WITH LOSS OF CONSCIOUSNESS, SUBSEQUENT ENCOUNTER: Status: ACTIVE | Noted: 2023-10-16

## 2024-03-29 ENCOUNTER — APPOINTMENT (OUTPATIENT)
Dept: NEUROSURGERY | Facility: CLINIC | Age: 39
End: 2024-03-29
Payer: OTHER MISCELLANEOUS

## 2024-03-29 VITALS
HEIGHT: 71 IN | HEART RATE: 74 BPM | WEIGHT: 265 LBS | BODY MASS INDEX: 37.1 KG/M2 | DIASTOLIC BLOOD PRESSURE: 73 MMHG | SYSTOLIC BLOOD PRESSURE: 123 MMHG | OXYGEN SATURATION: 95 %

## 2024-03-29 DIAGNOSIS — F32.A DEPRESSION, UNSPECIFIED: ICD-10-CM

## 2024-03-29 DIAGNOSIS — S06.9X9D UNSPECIFIED INTRACRANIAL INJURY WITH LOSS OF CONSCIOUSNESS OF UNSPECIFIED DURATION, SUBSEQUENT ENCOUNTER: ICD-10-CM

## 2024-03-29 DIAGNOSIS — F07.81 POSTCONCUSSIONAL SYNDROME: ICD-10-CM

## 2024-03-29 PROCEDURE — 99213 OFFICE O/P EST LOW 20 MIN: CPT

## 2024-04-04 NOTE — HISTORY OF PRESENT ILLNESS
[FreeTextEntry1] : Mr Topete is a 34 yrs old pleasant gentleman here for follow up on his concussion. Pt works as a  and on 7/22/19 while working on the [employer], he was snagged and catapulted to a metal beam above his head, hit head and lost consciousness for about 5 minutes.. He woke up disoriented, with a headache and an left eye laceration.He was taken to [facility] and had 3 syncopal episodes. He underwent CT c-spine, CT head, CT maxillofacial, CT chest CT abdomen and pelvis, x-ray pelvis and CXR all which were negative as per reports. Pt had multiple follow up in this office and was slowly recovering from concussion. Last seen on 8/2021.  Last visit 10/16/2023, reported headaches every other day. Tylenol helped with headaches. Pt has not followed up.  He has been trying to find work, but unable.  He  is taking Aleve PM for his headaches which does help sometimes.  He sometimes stays awake and restless.  MRI did not get done as it was not authorized prior to the appointment. Neurologist: Crispin Chacon, neurologist [address] [place] [address] Ph: [phone]

## 2024-04-04 NOTE — ASSESSMENT
[FreeTextEntry1] : 37 y/o M with TBI w/p concussion after a work-related injury in 2019.  Last visit 10/16/23. He reports headaches, light sensitivity, difficulty focusing.  He was prescribed Amitriptyline 25 at bedtime - he had to stop because of side effects making him aggressive, stomach pain. He has a headache neurologist, Dr. Chacon and should follow up with him.  MRI head, no contrast. Return in 6 weeks.

## 2024-04-04 NOTE — PHYSICAL EXAM
[General Appearance - Alert] : alert [General Appearance - In No Acute Distress] : in no acute distress [General Appearance - Well Nourished] : well nourished [General Appearance - Well-Appearing] : healthy appearing [Impaired Insight] : insight and judgment were intact [Oriented To Time, Place, And Person] : oriented to person, place, and time [Affect] : the affect was normal [Memory Recent] : recent memory was not impaired [Person] : oriented to person [Place] : oriented to place [Time] : oriented to time [Cranial Nerves Optic (II)] : visual acuity intact bilaterally,  pupils equal round and reactive to light [Short Term Intact] : short term memory intact [Cranial Nerves Oculomotor (III)] : extraocular motion intact [Cranial Nerves Trigeminal (V)] : facial sensation intact symmetrically [Cranial Nerves Vestibulocochlear (VIII)] : hearing was intact bilaterally [Cranial Nerves Facial (VII)] : face symmetrical [Cranial Nerves Glossopharyngeal (IX)] : tongue and palate midline [Cranial Nerves Accessory (XI - Cranial And Spinal)] : head turning and shoulder shrug symmetric [Motor Tone] : muscle tone was normal in all four extremities [Cranial Nerves Hypoglossal (XII)] : there was no tongue deviation with protrusion [Motor Strength] : muscle strength was normal in all four extremities [Sensation Tactile Decrease] : light touch was intact [Sensation Pain / Temperature Decrease] : pain and temperature was intact [Balance] : balance was intact [1+] : Triceps left 1+ [2+] : Ankle jerk left 2+ [Sclera] : the sclera and conjunctiva were normal [PERRL With Normal Accommodation] : pupils were equal in size, round, reactive to light, with normal accommodation [Outer Ear] : the ears and nose were normal in appearance [Both Tympanic Membranes Were Examined] : both tympanic membranes were normal [Neck Appearance] : the appearance of the neck was normal [] : no respiratory distress [Respiration, Rhythm And Depth] : normal respiratory rhythm and effort [Apical Impulse] : the apical impulse was normal [Heart Rate And Rhythm] : heart rate was normal and rhythm regular [Arterial Pulses Carotid] : carotid pulses were normal with no bruits [Abdominal Aorta] : the abdominal aorta was normal [No Spinal Tenderness] : no spinal tenderness [Abnormal Walk] : normal gait [Involuntary Movements] : no involuntary movements were seen [Skin Color & Pigmentation] : normal skin color and pigmentation [FreeTextEntry8] : no drift, no imbalance on tandem [Restricted] : was not restricted [Pain] : was painless

## 2024-04-04 NOTE — REVIEW OF SYSTEMS
[Dizziness] : dizziness [Cluster Headache] : cluster headaches [Negative] : Integumentary [FreeTextEntry9] : neck pain

## 2024-04-10 ENCOUNTER — OUTPATIENT (OUTPATIENT)
Dept: OUTPATIENT SERVICES | Facility: HOSPITAL | Age: 39
LOS: 1 days | End: 2024-04-10
Payer: COMMERCIAL

## 2024-04-10 ENCOUNTER — APPOINTMENT (OUTPATIENT)
Dept: MRI IMAGING | Facility: CLINIC | Age: 39
End: 2024-04-10
Payer: OTHER MISCELLANEOUS

## 2024-04-10 DIAGNOSIS — S06.9X9D UNSPECIFIED INTRACRANIAL INJURY WITH LOSS OF CONSCIOUSNESS OF UNSPECIFIED DURATION, SUBSEQUENT ENCOUNTER: ICD-10-CM

## 2024-04-10 PROCEDURE — 70551 MRI BRAIN STEM W/O DYE: CPT

## 2024-04-10 PROCEDURE — 70551 MRI BRAIN STEM W/O DYE: CPT | Mod: 26

## 2024-05-20 ENCOUNTER — APPOINTMENT (OUTPATIENT)
Dept: NEUROSURGERY | Facility: CLINIC | Age: 39
End: 2024-05-20
Payer: OTHER MISCELLANEOUS

## 2024-05-20 VITALS
HEIGHT: 71 IN | DIASTOLIC BLOOD PRESSURE: 85 MMHG | OXYGEN SATURATION: 97 % | HEART RATE: 71 BPM | BODY MASS INDEX: 37.1 KG/M2 | WEIGHT: 265 LBS | SYSTOLIC BLOOD PRESSURE: 139 MMHG

## 2024-05-20 PROCEDURE — 99214 OFFICE O/P EST MOD 30 MIN: CPT

## 2024-05-30 NOTE — ASSESSMENT
[FreeTextEntry1] : 39 y/o M with TBI w/p concussion after a work-related injury in 2019.  He reports headaches, but are better today MRI wnl Pt can return to work with some restrictions Pt is 10% disabled at this time, some restrictions may apply Pt to discuss with neurologist and union and will let us know about accommodations, if needed.

## 2024-05-30 NOTE — HISTORY OF PRESENT ILLNESS
[FreeTextEntry1] : Mr Topete is a 34 yrs old pleasant gentleman here for follow up on his concussion. Pt works as a  and on 7/22/19 while working on the [employer], he was snagged and catapulted to a metal beam above his head, hit head and lost consciousness for about 5 minutes.. He woke up disoriented, with a headache and an left eye laceration.He was taken to [facility] and had 3 syncopal episodes. He underwent CT c-spine, CT head, CT maxillofacial, CT chest CT abdomen and pelvis, x-ray pelvis and CXR all which were negative as per reports. Pt had multiple follow up in this office and was slowly recovering from concussion. Last seen on 8/2021.  Pt returned with occasional headache. Tylenol helped with headaches. He sometimes stays awake and restless.  Wife reports that sometimes get worse with HA and had to take many doses of Tylenol. He completed MRI head recently here for discussion.

## 2024-10-28 ENCOUNTER — NON-APPOINTMENT (OUTPATIENT)
Age: 39
End: 2024-10-28

## 2024-10-28 ENCOUNTER — APPOINTMENT (OUTPATIENT)
Dept: NEUROSURGERY | Facility: CLINIC | Age: 39
End: 2024-10-28

## 2024-10-28 VITALS
OXYGEN SATURATION: 95 % | SYSTOLIC BLOOD PRESSURE: 126 MMHG | HEIGHT: 71 IN | WEIGHT: 315 LBS | HEART RATE: 86 BPM | DIASTOLIC BLOOD PRESSURE: 77 MMHG | BODY MASS INDEX: 44.1 KG/M2

## 2024-10-28 PROCEDURE — 99214 OFFICE O/P EST MOD 30 MIN: CPT

## 2024-10-28 RX ORDER — TOPIRAMATE 25 MG/1
25 TABLET, FILM COATED ORAL
Qty: 50 | Refills: 2 | Status: ACTIVE | COMMUNITY
Start: 2024-10-28 | End: 1900-01-01

## 2024-11-25 ENCOUNTER — APPOINTMENT (OUTPATIENT)
Dept: NEUROSURGERY | Facility: CLINIC | Age: 39
End: 2024-11-25

## 2024-11-25 VITALS
HEIGHT: 72 IN | DIASTOLIC BLOOD PRESSURE: 86 MMHG | HEART RATE: 86 BPM | WEIGHT: 315 LBS | SYSTOLIC BLOOD PRESSURE: 139 MMHG | BODY MASS INDEX: 42.66 KG/M2 | RESPIRATION RATE: 17 BRPM | OXYGEN SATURATION: 96 %

## 2024-11-25 DIAGNOSIS — S06.0XAA CONCUSSION WITH LOSS OF CONSCIOUSNESS STATUS UNKNOWN, INITIAL ENCOUNTER: ICD-10-CM

## 2024-11-25 PROCEDURE — 99213 OFFICE O/P EST LOW 20 MIN: CPT

## 2024-12-11 ENCOUNTER — NON-APPOINTMENT (OUTPATIENT)
Age: 39
End: 2024-12-11

## 2024-12-16 PROBLEM — M25.561 RIGHT KNEE PAIN: Status: ACTIVE | Noted: 2024-12-16

## 2024-12-19 ENCOUNTER — APPOINTMENT (OUTPATIENT)
Dept: ORTHOPEDIC SURGERY | Facility: CLINIC | Age: 39
End: 2024-12-19
Payer: MEDICAID

## 2024-12-19 DIAGNOSIS — M25.561 PAIN IN RIGHT KNEE: ICD-10-CM

## 2024-12-19 PROCEDURE — 99203 OFFICE O/P NEW LOW 30 MIN: CPT | Mod: 25

## 2024-12-19 PROCEDURE — 73564 X-RAY EXAM KNEE 4 OR MORE: CPT | Mod: RT

## 2024-12-19 PROCEDURE — 72170 X-RAY EXAM OF PELVIS: CPT

## 2024-12-19 RX ORDER — MELOXICAM 15 MG/1
15 TABLET ORAL DAILY
Qty: 30 | Refills: 1 | Status: ACTIVE | COMMUNITY
Start: 2024-12-19 | End: 1900-01-01

## 2024-12-23 ENCOUNTER — APPOINTMENT (OUTPATIENT)
Dept: NEUROSURGERY | Facility: CLINIC | Age: 39
End: 2024-12-23

## 2024-12-23 ENCOUNTER — NON-APPOINTMENT (OUTPATIENT)
Age: 39
End: 2024-12-23

## 2024-12-23 VITALS
WEIGHT: 315 LBS | HEIGHT: 72 IN | BODY MASS INDEX: 42.66 KG/M2 | SYSTOLIC BLOOD PRESSURE: 142 MMHG | DIASTOLIC BLOOD PRESSURE: 77 MMHG | HEART RATE: 83 BPM | OXYGEN SATURATION: 94 %

## 2024-12-23 DIAGNOSIS — S06.0XAA CONCUSSION WITH LOSS OF CONSCIOUSNESS STATUS UNKNOWN, INITIAL ENCOUNTER: ICD-10-CM

## 2024-12-23 PROCEDURE — 99213 OFFICE O/P EST LOW 20 MIN: CPT

## 2025-02-24 ENCOUNTER — APPOINTMENT (OUTPATIENT)
Dept: NEUROSURGERY | Facility: CLINIC | Age: 40
End: 2025-02-24
Payer: OTHER MISCELLANEOUS

## 2025-02-24 ENCOUNTER — NON-APPOINTMENT (OUTPATIENT)
Age: 40
End: 2025-02-24

## 2025-02-24 VITALS
SYSTOLIC BLOOD PRESSURE: 136 MMHG | HEART RATE: 81 BPM | OXYGEN SATURATION: 98 % | DIASTOLIC BLOOD PRESSURE: 85 MMHG | RESPIRATION RATE: 17 BRPM | HEIGHT: 72 IN | BODY MASS INDEX: 42.66 KG/M2 | WEIGHT: 315 LBS

## 2025-02-24 DIAGNOSIS — S06.0XAA CONCUSSION WITH LOSS OF CONSCIOUSNESS STATUS UNKNOWN, INITIAL ENCOUNTER: ICD-10-CM

## 2025-02-24 PROCEDURE — 99215 OFFICE O/P EST HI 40 MIN: CPT

## 2025-02-24 RX ORDER — TOPIRAMATE 25 MG/1
25 TABLET, FILM COATED ORAL
Qty: 90 | Refills: 2 | Status: ACTIVE | COMMUNITY
Start: 2025-02-24 | End: 1900-01-01

## 2025-03-04 ENCOUNTER — APPOINTMENT (OUTPATIENT)
Dept: ORTHOPEDIC SURGERY | Facility: CLINIC | Age: 40
End: 2025-03-04
Payer: MEDICAID

## 2025-03-04 DIAGNOSIS — M25.561 PAIN IN RIGHT KNEE: ICD-10-CM

## 2025-03-04 PROCEDURE — 99213 OFFICE O/P EST LOW 20 MIN: CPT

## 2025-03-21 ENCOUNTER — NON-APPOINTMENT (OUTPATIENT)
Age: 40
End: 2025-03-21

## 2025-03-24 ENCOUNTER — APPOINTMENT (OUTPATIENT)
Dept: NEUROSURGERY | Facility: CLINIC | Age: 40
End: 2025-03-24

## 2025-03-24 VITALS
OXYGEN SATURATION: 94 % | BODY MASS INDEX: 44.1 KG/M2 | SYSTOLIC BLOOD PRESSURE: 137 MMHG | DIASTOLIC BLOOD PRESSURE: 81 MMHG | HEART RATE: 95 BPM | WEIGHT: 315 LBS | HEIGHT: 71 IN

## 2025-03-24 PROCEDURE — 99214 OFFICE O/P EST MOD 30 MIN: CPT

## 2025-03-24 RX ORDER — TOPIRAMATE 25 MG/1
25 TABLET, FILM COATED ORAL
Qty: 270 | Refills: 1 | Status: ACTIVE | COMMUNITY
Start: 2025-03-24 | End: 1900-01-01

## 2025-05-05 ENCOUNTER — APPOINTMENT (OUTPATIENT)
Dept: NEUROSURGERY | Facility: CLINIC | Age: 40
End: 2025-05-05
Payer: OTHER MISCELLANEOUS

## 2025-05-05 VITALS
RESPIRATION RATE: 71 BRPM | SYSTOLIC BLOOD PRESSURE: 131 MMHG | HEIGHT: 71 IN | DIASTOLIC BLOOD PRESSURE: 83 MMHG | BODY MASS INDEX: 44.1 KG/M2 | OXYGEN SATURATION: 97 % | HEART RATE: 81 BPM | WEIGHT: 315 LBS

## 2025-05-05 DIAGNOSIS — S06.0XAA CONCUSSION WITH LOSS OF CONSCIOUSNESS STATUS UNKNOWN, INITIAL ENCOUNTER: ICD-10-CM

## 2025-05-05 PROCEDURE — 99214 OFFICE O/P EST MOD 30 MIN: CPT

## 2025-07-07 ENCOUNTER — APPOINTMENT (OUTPATIENT)
Dept: NEUROSURGERY | Facility: CLINIC | Age: 40
End: 2025-07-07

## 2025-07-07 ENCOUNTER — NON-APPOINTMENT (OUTPATIENT)
Age: 40
End: 2025-07-07

## 2025-08-11 ENCOUNTER — APPOINTMENT (OUTPATIENT)
Dept: NEUROSURGERY | Facility: CLINIC | Age: 40
End: 2025-08-11
Payer: OTHER MISCELLANEOUS

## 2025-08-11 VITALS
OXYGEN SATURATION: 97 % | SYSTOLIC BLOOD PRESSURE: 139 MMHG | RESPIRATION RATE: 16 BRPM | HEART RATE: 89 BPM | DIASTOLIC BLOOD PRESSURE: 80 MMHG | WEIGHT: 315 LBS | HEIGHT: 71 IN | BODY MASS INDEX: 44.1 KG/M2

## 2025-08-11 DIAGNOSIS — S06.0XAA CONCUSSION WITH LOSS OF CONSCIOUSNESS STATUS UNKNOWN, INITIAL ENCOUNTER: ICD-10-CM

## 2025-08-11 PROCEDURE — 99213 OFFICE O/P EST LOW 20 MIN: CPT

## 2025-08-14 ENCOUNTER — NON-APPOINTMENT (OUTPATIENT)
Age: 40
End: 2025-08-14